# Patient Record
Sex: FEMALE | Race: WHITE
[De-identification: names, ages, dates, MRNs, and addresses within clinical notes are randomized per-mention and may not be internally consistent; named-entity substitution may affect disease eponyms.]

---

## 2017-04-05 ENCOUNTER — HOSPITAL ENCOUNTER (EMERGENCY)
Dept: HOSPITAL 25 - UCEAST | Age: 62
Discharge: LEFT BEFORE DISCHARGE | End: 2017-04-05
Payer: COMMERCIAL

## 2017-04-05 DIAGNOSIS — Z53.21: ICD-10-CM

## 2017-04-05 DIAGNOSIS — M79.646: ICD-10-CM

## 2017-04-05 DIAGNOSIS — M25.529: Primary | ICD-10-CM

## 2019-09-21 ENCOUNTER — HOSPITAL ENCOUNTER (INPATIENT)
Dept: HOSPITAL 25 - ED | Age: 64
LOS: 11 days | Discharge: HOME | DRG: 720 | End: 2019-10-02
Attending: INTERNAL MEDICINE | Admitting: INTERNAL MEDICINE
Payer: COMMERCIAL

## 2019-09-21 ENCOUNTER — HOSPITAL ENCOUNTER (EMERGENCY)
Dept: HOSPITAL 25 - UCEAST | Age: 64
Discharge: HOME | End: 2019-09-21
Payer: COMMERCIAL

## 2019-09-21 VITALS — DIASTOLIC BLOOD PRESSURE: 80 MMHG | SYSTOLIC BLOOD PRESSURE: 156 MMHG

## 2019-09-21 DIAGNOSIS — B34.9: Primary | ICD-10-CM

## 2019-09-21 DIAGNOSIS — R80.9: ICD-10-CM

## 2019-09-21 DIAGNOSIS — J91.8: ICD-10-CM

## 2019-09-21 DIAGNOSIS — B02.9: ICD-10-CM

## 2019-09-21 DIAGNOSIS — E87.2: ICD-10-CM

## 2019-09-21 DIAGNOSIS — J96.01: ICD-10-CM

## 2019-09-21 DIAGNOSIS — A48.1: ICD-10-CM

## 2019-09-21 DIAGNOSIS — I10: ICD-10-CM

## 2019-09-21 DIAGNOSIS — E46: ICD-10-CM

## 2019-09-21 DIAGNOSIS — I48.91: ICD-10-CM

## 2019-09-21 DIAGNOSIS — E11.9: ICD-10-CM

## 2019-09-21 DIAGNOSIS — J44.0: ICD-10-CM

## 2019-09-21 DIAGNOSIS — E87.6: ICD-10-CM

## 2019-09-21 DIAGNOSIS — M81.0: ICD-10-CM

## 2019-09-21 DIAGNOSIS — Z79.01: ICD-10-CM

## 2019-09-21 DIAGNOSIS — E78.5: ICD-10-CM

## 2019-09-21 DIAGNOSIS — K76.0: ICD-10-CM

## 2019-09-21 DIAGNOSIS — E27.8: ICD-10-CM

## 2019-09-21 DIAGNOSIS — A41.9: Primary | ICD-10-CM

## 2019-09-21 DIAGNOSIS — Z80.1: ICD-10-CM

## 2019-09-21 DIAGNOSIS — Z79.84: ICD-10-CM

## 2019-09-21 DIAGNOSIS — J44.1: ICD-10-CM

## 2019-09-21 DIAGNOSIS — J18.1: ICD-10-CM

## 2019-09-21 DIAGNOSIS — F17.210: ICD-10-CM

## 2019-09-21 DIAGNOSIS — Z87.442: ICD-10-CM

## 2019-09-21 DIAGNOSIS — R51: ICD-10-CM

## 2019-09-21 LAB
ALBUMIN SERPL BCG-MCNC: 3.6 G/DL (ref 3.2–5.2)
ALBUMIN/GLOB SERPL: 1 {RATIO} (ref 1–3)
ALP SERPL-CCNC: 63 U/L (ref 34–104)
ALT SERPL W P-5'-P-CCNC: 21 U/L (ref 7–52)
ANION GAP SERPL CALC-SCNC: 9 MMOL/L (ref 2–11)
AST SERPL-CCNC: 26 U/L (ref 13–39)
BASOPHILS # BLD AUTO: 0.1 10^3/UL (ref 0–0.2)
BUN SERPL-MCNC: 15 MG/DL (ref 6–24)
BUN/CREAT SERPL: 18.8 (ref 8–20)
CALCIUM SERPL-MCNC: 9.1 MG/DL (ref 8.6–10.3)
CHLORIDE SERPL-SCNC: 95 MMOL/L (ref 101–111)
EOSINOPHIL # BLD AUTO: 0 10^3/UL (ref 0–0.6)
FLUAV RNA SPEC QL NAA+PROBE: NEGATIVE
FLUBV RNA SPEC QL NAA+PROBE: NEGATIVE
GLOBULIN SER CALC-MCNC: 3.5 G/DL (ref 2–4)
GLUCOSE SERPL-MCNC: 287 MG/DL (ref 70–100)
HCO3 SERPL-SCNC: 21 MMOL/L (ref 22–32)
HCT VFR BLD AUTO: 42 % (ref 35–47)
HGB BLD-MCNC: 14 G/DL (ref 12–16)
INR PPP/BLD: 1.26 (ref 0.82–1.09)
LYMPHOCYTES # BLD AUTO: 0.5 10^3/UL (ref 1–4.8)
MCH RBC QN AUTO: 27 PG (ref 27–31)
MCHC RBC AUTO-ENTMCNC: 33 G/DL (ref 31–36)
MCV RBC AUTO: 82 FL (ref 80–97)
MONOCYTES # BLD AUTO: 0.5 10^3/UL (ref 0–0.8)
NEUTROPHILS # BLD AUTO: 11.1 10^3/UL (ref 1.5–7.7)
NRBC # BLD AUTO: 0 10^3/UL
NRBC BLD QL AUTO: 0
PLATELET # BLD AUTO: 164 10^3/UL (ref 150–450)
POTASSIUM SERPL-SCNC: 3.8 MMOL/L (ref 3.5–5)
PROT SERPL-MCNC: 7.1 G/DL (ref 6.4–8.9)
RBC # BLD AUTO: 5.11 10^6 /UL (ref 3.7–4.87)
RBC UR QL AUTO: (no result)
SODIUM SERPL-SCNC: 125 MMOL/L (ref 135–145)
TROPONIN I SERPL-MCNC: 0.1 NG/ML (ref ?–0.04)
WBC # BLD AUTO: 12.2 10^3/UL (ref 3.5–10.8)
WBC UR QL AUTO: (no result)

## 2019-09-21 PROCEDURE — 85610 PROTHROMBIN TIME: CPT

## 2019-09-21 PROCEDURE — 87899 AGENT NOS ASSAY W/OPTIC: CPT

## 2019-09-21 PROCEDURE — 82565 ASSAY OF CREATININE: CPT

## 2019-09-21 PROCEDURE — 71260 CT THORAX DX C+: CPT

## 2019-09-21 PROCEDURE — 86140 C-REACTIVE PROTEIN: CPT

## 2019-09-21 PROCEDURE — 84156 ASSAY OF PROTEIN URINE: CPT

## 2019-09-21 PROCEDURE — 99285 EMERGENCY DEPT VISIT HI MDM: CPT

## 2019-09-21 PROCEDURE — 83036 HEMOGLOBIN GLYCOSYLATED A1C: CPT

## 2019-09-21 PROCEDURE — 74176 CT ABD & PELVIS W/O CONTRAST: CPT

## 2019-09-21 PROCEDURE — 32555 ASPIRATE PLEURA W/ IMAGING: CPT

## 2019-09-21 PROCEDURE — 36415 COLL VENOUS BLD VENIPUNCTURE: CPT

## 2019-09-21 PROCEDURE — 93005 ELECTROCARDIOGRAM TRACING: CPT

## 2019-09-21 PROCEDURE — 82040 ASSAY OF SERUM ALBUMIN: CPT

## 2019-09-21 PROCEDURE — 80202 ASSAY OF VANCOMYCIN: CPT

## 2019-09-21 PROCEDURE — 80061 LIPID PANEL: CPT

## 2019-09-21 PROCEDURE — 82803 BLOOD GASES ANY COMBINATION: CPT

## 2019-09-21 PROCEDURE — 76604 US EXAM CHEST: CPT

## 2019-09-21 PROCEDURE — 80048 BASIC METABOLIC PNL TOTAL CA: CPT

## 2019-09-21 PROCEDURE — 87077 CULTURE AEROBIC IDENTIFY: CPT

## 2019-09-21 PROCEDURE — 87040 BLOOD CULTURE FOR BACTERIA: CPT

## 2019-09-21 PROCEDURE — 84100 ASSAY OF PHOSPHORUS: CPT

## 2019-09-21 PROCEDURE — 83605 ASSAY OF LACTIC ACID: CPT

## 2019-09-21 PROCEDURE — 80053 COMPREHEN METABOLIC PANEL: CPT

## 2019-09-21 PROCEDURE — 87651 STREP A DNA AMP PROBE: CPT

## 2019-09-21 PROCEDURE — 81015 MICROSCOPIC EXAM OF URINE: CPT

## 2019-09-21 PROCEDURE — 81003 URINALYSIS AUTO W/O SCOPE: CPT

## 2019-09-21 PROCEDURE — 87205 SMEAR GRAM STAIN: CPT

## 2019-09-21 PROCEDURE — 36600 WITHDRAWAL OF ARTERIAL BLOOD: CPT

## 2019-09-21 PROCEDURE — 84157 ASSAY OF PROTEIN OTHER: CPT

## 2019-09-21 PROCEDURE — 84155 ASSAY OF PROTEIN SERUM: CPT

## 2019-09-21 PROCEDURE — 94640 AIRWAY INHALATION TREATMENT: CPT

## 2019-09-21 PROCEDURE — 84484 ASSAY OF TROPONIN QUANT: CPT

## 2019-09-21 PROCEDURE — 82570 ASSAY OF URINE CREATININE: CPT

## 2019-09-21 PROCEDURE — 85025 COMPLETE CBC W/AUTO DIFF WBC: CPT

## 2019-09-21 PROCEDURE — 71045 X-RAY EXAM CHEST 1 VIEW: CPT

## 2019-09-21 PROCEDURE — 87070 CULTURE OTHR SPECIMN AEROBIC: CPT

## 2019-09-21 PROCEDURE — 87086 URINE CULTURE/COLONY COUNT: CPT

## 2019-09-21 PROCEDURE — 71046 X-RAY EXAM CHEST 2 VIEWS: CPT

## 2019-09-21 PROCEDURE — 83735 ASSAY OF MAGNESIUM: CPT

## 2019-09-21 PROCEDURE — 87641 MR-STAPH DNA AMP PROBE: CPT

## 2019-09-21 PROCEDURE — 84520 ASSAY OF UREA NITROGEN: CPT

## 2019-09-21 PROCEDURE — 84134 ASSAY OF PREALBUMIN: CPT

## 2019-09-21 PROCEDURE — G0463 HOSPITAL OUTPT CLINIC VISIT: HCPCS

## 2019-09-21 PROCEDURE — 89051 BODY FLUID CELL COUNT: CPT

## 2019-09-21 PROCEDURE — 83615 LACTATE (LD) (LDH) ENZYME: CPT

## 2019-09-21 PROCEDURE — 99211 OFF/OP EST MAY X REQ PHY/QHP: CPT

## 2019-09-21 PROCEDURE — 87186 SC STD MICRODIL/AGAR DIL: CPT

## 2019-09-21 PROCEDURE — 99406 BEHAV CHNG SMOKING 3-10 MIN: CPT

## 2019-09-21 PROCEDURE — 93306 TTE W/DOPPLER COMPLETE: CPT

## 2019-09-21 PROCEDURE — 80076 HEPATIC FUNCTION PANEL: CPT

## 2019-09-21 NOTE — ED
Abdominal Pain/Female





- HPI Summary


HPI Summary: 


The patient is a 63 y/o F presenting to [facility] accompanied by grandson with a 

chief complaint of sudden onset left flank and low left back pain onset at 1700 

tonight. She reports that she has been suffering from a head cold with symptoms 

including a cough, rhinorrhea, and sinus congestion. She went to [facility] 

care for these symptoms this morning, and she was diagnosed with a viral 

infection and discharged home. She then went home and took a nap but woke up 

with left flank pain. She is now additionally c/o fever, weakness, mild SOB, 

and decreased appetite. Currently, her symptoms are rated 4/10 in severity. PMHx

: DM, HTN, kidney stones with lithotripsy. Heavy everyday cigarette smoker, no 

EtOH, no substance use. Medications reviewed. Allergies noted.





- History of Current Complaint


Chief Complaint: EDFlankPain


Stated Complaint: THINKS SHE HAS A KIDNEY INF PER PT


Time Seen by Provider: 19 22:49


Hx Obtained From: Patient, Family/Caretaker - grandson


Hx Last Menstrual Period: Years.


Onset/Duration: Sudden Onset - flank and back pain, Lasting Hours, Still Present


Timing: Hours


Severity Initially: Mild


Severity Currently: Moderate


Pain Intensity: 4


Pain Scale Used: 0-10 Numeric


Location: Flank - left


Radiates: Yes


Radiates to: Back - low left


Character: Dull


Aggravating Factor(s): Nothing


Alleviating Factor(s): Nothing


Associated Signs and Symptoms: Positive: Fever, Cough, Decreased Appetite, Other

: - weakness, mild SOB, rhinorrhea, sinus congestion


Allergies/Adverse Reactions: 


 Allergies











Allergy/AdvReac Type Severity Reaction Status Date / Time


 


No Known Allergies Allergy   Verified 19 19:27














PMH/Surg Hx/FS Hx/Imm Hx


Endocrine/Hematology History: Reports: Hx Diabetes


   Denies: Hx Thyroid Disease


Cardiovascular History: Reports: Hx Hypertension


   Denies: Hx Congestive Heart Failure, Hx Deep Vein Thrombosis, Hx Myocardial 

Infarction, Hx Pacemaker/ICD


Respiratory History: 


   Denies: Hx Asthma, Hx Chronic Obstructive Pulmonary Disease (COPD), Hx Lung 

Cancer, Hx Pneumonia, Hx Pulmonary Embolism


GI History: 


   Denies: Hx Gall Bladder Disease, Hx Gastrointestinal Bleed, Hx Ulcer, Hx 

Urosepsis


 History: Reports: Hx Kidney Stones


   Denies: Hx Renal Disease


Musculoskeletal History: 


   Denies: Hx Osteoporosis


Opthamlomology History: Reports: Hx Contacts or Glasses


Neurological History: 


   Denies: Hx Dementia, Hx Migraine, Hx Seizures, Hx Transient Ischemic Attacks 

(TIA)


Psychiatric History: 


   Denies: Hx Anxiety, Hx Depression, Hx Schizophrenia, Hx Bipolar Disorder





- Cancer History


Hx Chemotherapy: No


Hx Radiation Therapy: No





- Surgical History


Surgical History: Yes


Surgery Procedure, Year, and Place: L-SPINE.  .  LASER LITHOTRIPSY FOR 

KIDNEY STONES


Infectious Disease History: No


Infectious Disease History: 


   Denies: History Other Infectious Disease, Traveled Outside the [place] in Last 30 

Days





- Family History


Known Family History: 


   Negative: Cardiac Disease, Hypertension, Diabetes





- Social History


Alcohol Use: None


Hx Substance Use: No


Substance Use Type: Reports: None


Hx Tobacco Use: Yes


Smoking Status (MU): Heavy Every Day Tobacco Smoker


Type: Cigarettes


Amount Used/How Often: 1 PPD





Review of Systems


Positive: Fever


Positive: Other - rhinorrhea, sinus congestion


Positive: Shortness Of Breath, Cough


Positive: Other - decreased appetite


Positive: flank pain - left


Positive: Other - low left back pain


Positive: Weakness


All Other Systems Reviewed And Are Negative: Yes





Physical Exam





- Summary


Physical Exam Summary: 


Appearance: Ill-appearing, Well-nourished, lying in bed comfortably but 

somewhat somnolent although easy to arouse


Skin: Warm, dry, no obvious rash


Eyes: sclera anicteric, no conjunctival pallor


ENT: mucous membranes moist, pharynx appears normal


Neck: Supple, nontender


Respiratory: Clear to auscultation, no signs of respiratory distress


Cardiovascular: Tachycardic in mid 120s. No murmurs. Normal distal pulses in 

tibial and radial bilaterally.


Abdomen: Soft, nontender, normal active bowel sounds present


Musculoskeletal: No CVA tenderness, Normal, Strength/ROM Intact


Neurological: A&Ox3, awake and alert, mentation is normal, speech is fluent and 

appropriate


Psychiatric: affect is normal, does not appear anxious or depressed


Triage Information Reviewed: Yes


Vital Signs On Initial Exam: 


 Initial Vitals











Temp Pulse Resp BP Pulse Ox


 


 102.1 F   138   20   153/97   95 


 


 19 19:26  19 19:26  19 19:26  19 19:26  19 19:26











Vital Signs Reviewed: Yes





Diagnostics





- Vital Signs


 Vital Signs











  Temp Pulse Resp BP Pulse Ox


 


 09/21/19 19:26  102.1 F  138  20  153/97  95














- Laboratory


Lab Results: 


 Lab Results











  19 Range/Units





  22:05 22:05 22:05 


 


WBC  12.2 H    (3.5-10.8)  10^3/uL


 


RBC  5.11 H    (3.70-4.87)  10^6 /uL


 


Hgb  14.0    (12.0-16.0)  g/dL


 


Hct  42    (35-47)  %


 


MCV  82    (80-97)  fL


 


MCH  27    (27-31)  pg


 


MCHC  33    (31-36)  g/dL


 


RDW  14    (10-15)  %


 


Plt Count  164    (150-450)  10^3/uL


 


MPV  8.7    (7.4-10.4)  fL


 


Neut % (Auto)  91.2    %


 


Lymph % (Auto)  4.2    %


 


Mono % (Auto)  4.2    %


 


Eos % (Auto)  0.0    %


 


Baso % (Auto)  0.4    %


 


Absolute Neuts (auto)  11.1 H    (1.5-7.7)  10^3/ul


 


Absolute Lymphs (auto)  0.5 L    (1.0-4.8)  10^3/ul


 


Absolute Monos (auto)  0.5    (0-0.8)  10^3/ul


 


Absolute Eos (auto)  0.0    (0-0.6)  10^3/ul


 


Absolute Basos (auto)  0.1    (0-0.2)  10^3/ul


 


Absolute Nucleated RBC  0.0    10^3/ul


 


Nucleated RBC %  0.0    


 


INR (Anticoag Therapy)   1.26 H   (0.82-1.09)  


 


Sodium    125 L  (135-145)  mmol/L


 


Potassium    3.8  (3.5-5.0)  mmol/L


 


Chloride    95 L  (101-111)  mmol/L


 


Carbon Dioxide    21 L  (22-32)  mmol/L


 


Anion Gap    9  (2-11)  mmol/L


 


BUN    15  (6-24)  mg/dL


 


Creatinine    0.80  (0.51-0.95)  mg/dL


 


Est GFR ( Amer)    87.9  (>60)  


 


Est GFR (Non-Af Amer)    72.7  (>60)  


 


BUN/Creatinine Ratio    18.8  (8-20)  


 


Glucose    287 H  ()  mg/dL


 


Lactic Acid     (0.5-2.0)  mmol/L


 


Calcium    9.1  (8.6-10.3)  mg/dL


 


Total Bilirubin    0.40  (0.2-1.0)  mg/dL


 


AST    26  (13-39)  U/L


 


ALT    21  (7-52)  U/L


 


Alkaline Phosphatase    63  ()  U/L


 


Troponin I    0.10 H*  (<0.04)  ng/mL


 


C-Reactive Protein    399.05 H  (<8.01)  mg/L


 


Total Protein    7.1  (6.4-8.9)  g/dL


 


Albumin    3.6  (3.2-5.2)  g/dL


 


Globulin    3.5  (2-4)  g/dL


 


Albumin/Globulin Ratio    1.0  (1-3)  














  19 Range/Units





  22:05 


 


WBC   (3.5-10.8)  10^3/uL


 


RBC   (3.70-4.87)  10^6 /uL


 


Hgb   (12.0-16.0)  g/dL


 


Hct   (35-47)  %


 


MCV   (80-97)  fL


 


MCH   (27-31)  pg


 


MCHC   (31-36)  g/dL


 


RDW   (10-15)  %


 


Plt Count   (150-450)  10^3/uL


 


MPV   (7.4-10.4)  fL


 


Neut % (Auto)   %


 


Lymph % (Auto)   %


 


Mono % (Auto)   %


 


Eos % (Auto)   %


 


Baso % (Auto)   %


 


Absolute Neuts (auto)   (1.5-7.7)  10^3/ul


 


Absolute Lymphs (auto)   (1.0-4.8)  10^3/ul


 


Absolute Monos (auto)   (0-0.8)  10^3/ul


 


Absolute Eos (auto)   (0-0.6)  10^3/ul


 


Absolute Basos (auto)   (0-0.2)  10^3/ul


 


Absolute Nucleated RBC   10^3/ul


 


Nucleated RBC %   


 


INR (Anticoag Therapy)   (0.82-1.09)  


 


Sodium   (135-145)  mmol/L


 


Potassium   (3.5-5.0)  mmol/L


 


Chloride   (101-111)  mmol/L


 


Carbon Dioxide   (22-32)  mmol/L


 


Anion Gap   (2-11)  mmol/L


 


BUN   (6-24)  mg/dL


 


Creatinine   (0.51-0.95)  mg/dL


 


Est GFR ( Amer)   (>60)  


 


Est GFR (Non-Af Amer)   (>60)  


 


BUN/Creatinine Ratio   (8-20)  


 


Glucose   ()  mg/dL


 


Lactic Acid  1.9  (0.5-2.0)  mmol/L


 


Calcium   (8.6-10.3)  mg/dL


 


Total Bilirubin   (0.2-1.0)  mg/dL


 


AST   (13-39)  U/L


 


ALT   (7-52)  U/L


 


Alkaline Phosphatase   ()  U/L


 


Troponin I   (<0.04)  ng/mL


 


C-Reactive Protein   (<8.01)  mg/L


 


Total Protein   (6.4-8.9)  g/dL


 


Albumin   (3.2-5.2)  g/dL


 


Globulin   (2-4)  g/dL


 


Albumin/Globulin Ratio   (1-3)  











Result Diagrams: 


 19 05:20





 19 05:20


Lab Statement: Any lab studies that have been ordered have been reviewed, and 

results considered in the medical decision making process.





- Radiology


  ** CXR


Radiology Interpretation Completed By: ED Physician


Summary of Radiographic Findings: Possible right lower lobe PNA seen on the 

lateral view with loss of hemidiaphragm. ED physician has interpreted this 

report. Pending official read.





- CT


  ** Abd/Pel CT


CT Interpretation Completed By: Radiologist


Summary of CT Findings: Impression: 1. Right lower lobe pneumonia. 2. Moderate 

hepatic steatosis (14-28% fat fraction). 3. Indeterminate right adrenal nodule. 

If patient has no cancer history, consider follow-up adrenal CT or resection. 

If patient has a history of cancer, consider biopsy or PET/CT for patients with 

cancer history. ED physician has reviewed this report.





- EKG


  ** 2355


Cardiac Rate: Other Rate - 133 bpm


Summary of EKG Findings: EKG at 2355 with rate of 133 bpm and unclear rhythm, 

possible MAT. No STEMI.





Re-Evaluation





- Re-Evaluation


  ** First Eval


Re-Evaluation Time: 01:35


Comment: We discussed all results with plan for admission.





Abdominal Pain Fem Course/Dx





- Course


Course Of Treatment: Pt is a 63 y/o F with cc of URI symptoms and new onset 

left flank and low left back pain at 1700 tonight accompanied by fever, weakness

, mild SOB, and decreased appetite. Upon physical exam, the pt is ill-appearing 

and somewhat somnolent but easily arousable without any CVA tenderness, 

although she exhibits tachycardia in the mid 120s. Blood work reveals WBCs 12.2

, RBCs 5.11, abs netus 11.1, abs lymphs 0.5, INR 1.26, ssdium 125, chloride 95, 

carbon dioxide 21, glucose 287, troponin 0.10, and .05. UA obtained and 

is consistent with infection revealing 3+ protein, 1+ ketones, 2+ blood, 3+ RBCs

, presence of RBCs casts, and 3+ glucose. EKG at 2355 with rate of 133 bpm and 

unclear rhythm, possible MAT. Chest x-ray, per my interpretation, reveals 

possible right lower lobe PNA seen on the lateral view with loss of 

hemidiaphragm. Abdomen/Pelvic CT reveals right lower lobe PNA with moderate 

hepatic steatosis and indeterminate right adrenal nodule. I discussed the pts 

case with Dr. Romero, hospitalist, and he accepts the pt for admission. She 

understands and agrees with this plan. Dx is right lower lobe PNA. 40 minutes 

CCT.





- Diagnoses


Provider Diagnoses: 


 Right lower lobe pneumonia








- Provider Notifications


Discussed Care Of Patient With: Kali Romero - hospitalist


Time Discussed With Above Provider: 01:30


Instructed by Provider To: Admit As Observation - After discussing the pt's case

, Dr. Romero accepts the pt for admission.





- Critical Care Time


Critical Care Time: 30-74 min - 40 minutes





Discharge ED





- Sign-Out/Discharge


Documenting (check all that apply): Patient Departure - Patient accepted for 

admission by Dr. Romero.


Patient Received Moderate/Deep Sedation with Procedure: No





- Discharge Plan


Condition: Stable


Disposition: ADMITTED TO [facility]





- Billing Disposition and Condition


Condition: STABLE


Disposition: Admitted to [facility]





- Attestation Statements


Document Initiated by Scribe: Yes


Documenting Scribe: Katiuska Mccurdy


Provider For Whom Gael is Documenting (Include Credential): Dr. Hans Lind MD


Scribe Attestation: 


Katiuska CABRERA, scribed for Dr. Hans Lind MD on 19 at 0649. 


Scribe Documentation Reviewed: Yes


Provider Attestation: 


The documentation as recorded by the [email or web address] accurately reflects 

the service I personally performed and the decisions made by me, Dr. Hans Lind MD


Status of Scribe Document: Viewed

## 2019-09-21 NOTE — UC
General HPI





- HPI Summary


HPI Summary: 


CHIEF COMPLAINT and HPI: This is a  64-year-old female who comes to the urgent 

care Center with a complaint of increasing body aches, fever, slight sore throat

, and frontal headache over the past 3 days.  She works at [employer] and came home from work 3 days ago.  She stayed home yesterday and she 

continues to feel achy, fatigued, and feverish.  Although she is a one pack per 

day smoker, she denies chest pain, shortness of breath, increasing cough or 

other symptoms consistent with COPD.  She has used albuterol inhaler in the 

past.  She does have a history of hypertension and diabetes.


VITAL SIGNS & SaO2 REVIEWED. Within normal limits unless noted here. 156/80; 

pulse=125; temp=100.8


NURSES NOTE REVIEWED: "Pt c/o flu-like sx: fever, chills that started last 

."  














- History of Current Complaint


Chief Complaint: UCGeneralIllness


Stated Complaint: FEVER


Time Seen by Provider: 19 10:02


Hx Last Menstrual Period: Years.


Pain Intensity: 6





- Allergy/Home Medications


Allergies/Adverse Reactions: 


 Allergies











Allergy/AdvReac Type Severity Reaction Status Date / Time


 


No Known Allergies Allergy   Verified 19 09:49











Home Medications: 


 Home Medications





glipiZIDE TAB* [Glucotrol TAB*] 5 mg PO DAILY 19 [History Confirmed ]











PMH/Surg Hx/FS Hx/Imm Hx





- Additional Past Medical History


Additional PMH: 





PAST MEDICAL HISTORY- 


CHRONIC and RECURRENT HEALTH PROBLEM LIST REVIEWED.


Information relevant to present complaint: diabetes, one pack per day smoker, 

hypertension.


VISIT HISTORY REVIEWED: 


MEDICATIONS & ALLERGIES REVIEWED.





SOCIAL HISTORY:  one pack per day smoker, lives with family, and works at 

[employer] in the lab.. 





Other History Of: 


   Negative For: HIV, Hepatitis B, Hepatitis C





- Surgical History


Surgical History: Yes


Surgery Procedure, Year, and Place: L-SPINE.  .  LASER LITHOTRIPSY FOR 

KIDNEY STONES





- Family History


Known Family History: Positive: None





- Social History


Alcohol Use: None


Substance Use Type: None


Smoking Status (MU): Heavy Every Day Tobacco Smoker


Amount Used/How Often: 1 PPD





Review of Systems


All Other Systems Reviewed And Are Negative: Yes


Constitutional: Positive: Fever, Chills, Fatigue


Skin: Positive: Negative.  Negative: Rash


Eyes: Positive: Negative


ENT: Positive: Negative


Respiratory: Positive: Negative.  Negative: Shortness Of Breath, Cough


Cardiovascular: Positive: Negative.  Negative: Palpitations, Chest Pain


Gastrointestinal: Positive: Negative.  Negative: Abdominal Pain, Vomiting, 

Nausea


Genitourinary: Positive: Negative.  Negative: Dysuria


Motor: Positive: Negative


Neurovascular: Positive: Negative


Musculoskeletal: Positive: Negative


Neurological: Positive: Headache - frontal headache, mild


Is Patient Immunocompromised?: No





Physical Exam





- Summary


Physical Exam Summary: 





Appearance: The patient  is well-nourished.  Patient is lying down and appears 

uncomfortable.  Her neck is supple.  She is normally conversant.


Eyes: Conjunctiva are clear.  Pupils are equal and reactive to light and 

accommodation.  Extra ocular muscle movement is intact.


ENT: The hearing is grossly normal, the pharynx is normal, and the TMs are 

normal. There is no muffled or hoarse voice.  No stridor.


Neck: The neck is supple and there is no lymphadenopathy.


Respiratory: The chest is non-tender to palpation and without crepitus. The 

lungs are clear, there are normal breath sounds, and there is no respiratory 

distress.  No wheezes, rales or rhonchi.


Cardiovascular: Heart sounds reveal a regular rate and rhythm. There are no 

clicks, rubs or murmurs.  There are no carotid bruits or thrills.  Circulation 

is grossly intact.patient is tachycardic. 


Abdomen: The abdomen is soft and nontender. There is no organomegaly.  Bowel 

sounds are present and within normal limits.  No point tenderness at McBurneys 

point. No CVA tenderness.


Musculoskeletal: Strength is intact. The patient moves all extremities.  


Neurological: The patient is alert. Motor and sensory are examination grossly 

intact.  Speech is normal.


Psychological: The patient displays age appropriate behavior, and is 

conversant. GCS=15.


Skin: Negative for rashes.








Triage Information Reviewed: Yes


Vital Signs: 


 Initial Vital Signs











Temp  100.8 F   19 09:51


 


Pulse  125   19 09:51


 


Resp  20   19 09:51


 


BP  156/80   19 09:51


 


Pulse Ox  96   19 09:51














Course/Dx





- Course


Course Of Treatment: 





This is a  64-year-old female who comes to the urgent care Center with a 

complaint of increasing body aches, fever, slight sore throat, and frontal 

headache over the past 3 days.  She works at [employer] and came 

home from work 3 days ago.  She stayed home yesterday and she continues to feel 

achy, fatigued, and feverish.  Although she is a one pack per day smoker, she 

denies chest pain, shortness of breath, increasing cough or other symptoms 

consistent with COPD.  She has used albuterol inhaler in the past.  She does 

have a history of hypertension and diabetes.


VITAL SIGNS & SaO2 REVIEWED. Within normal limits unless noted here. 156/80; 

pulse=125; temp=100.8  RAPID STREP: NEGATIVE;  FLU=NEGATIVE. NO ORTHOSTASIS on 

sitting and standing.  My diagnosis is viral syndrome.  I discussed this with 

patient and encouraged her to increase hydration and use ibuprofen and 

acetaminophen for discomfort.





- Differential Dx - Multi-Symptom


Differential Diagnoses: Urinary Tract Infection, Other - influenza, pneumonia, 

exacerbation of COPD, viral syndrome





- Diagnoses


Provider Diagnosis: 


 Viral syndrome








Discharge ED





- Sign-Out/Discharge


Documenting (check all that apply): Patient Departure


All imaging exams completed and their final reports reviewed: No Studies





- Discharge Plan


Condition: Stable


Disposition: HOME


Patient Education Materials:  Viral Syndrome (ED)


Forms:  *Work Release


Referrals: 


Rayna Hillman MD [Primary Care Provider] - 


Additional Instructions: 


AS WE DISCUSSED: 


PLEASE SEEK CARE AT THE EMERGENCY DEPARTMENT IF SYMPTOMS WORSEN OR IF NEW 

SYMPTOMS DEVELOP. 


FOLLOW UP WITH YOUR PRIMARY CARE PHYSICIAN IF CONDITION CONTINUES BEYOND 3 DAYS 

WITHOUT IMPROVEMENT.


YOUR DIAGNOSIS IS: viral syndrome.





OTHER INSTRUCTIONS:


Hypertension Discharge Instructions: 


Your blood pressure reading today was 156/80, indicating HYPERTENSION. Follow-

up with your primary care provider within 4 weeks for blood pressure check and 

appropriate recommendations and treatment, as needed. 





TREATMENT:  SEE ATTACHED INSTRUCTIONS.


FOR PAIN AND/OR SLEEP:


For pain:  Ibuprofen (Motrin and other brand names) 400-600mg PLUS 

acetaminophen (Tylenol and other brand names) 500mg - 1000mg every 8 hours.  

benadryl, DIPHENHYDRAMINE, CAN ALSO HELP YOU SLEEP.

















- Billing Disposition and Condition


Condition: STABLE


Disposition: Home

## 2019-09-22 LAB
ANION GAP SERPL CALC-SCNC: 10 MMOL/L (ref 2–11)
BASOPHILS # BLD AUTO: 0 10^3/UL (ref 0–0.2)
BUN SERPL-MCNC: 15 MG/DL (ref 6–24)
BUN/CREAT SERPL: 21.4 (ref 8–20)
CALCIUM SERPL-MCNC: 8.1 MG/DL (ref 8.6–10.3)
CHLORIDE SERPL-SCNC: 103 MMOL/L (ref 101–111)
CHOLEST SERPL-MCNC: 97 MG/DL
EOSINOPHIL # BLD AUTO: 0 10^3/UL (ref 0–0.6)
GLUCOSE SERPL-MCNC: 228 MG/DL (ref 70–100)
HCO3 SERPL-SCNC: 18 MMOL/L (ref 22–32)
HCT VFR BLD AUTO: 40 % (ref 35–47)
HDLC SERPL-MCNC: 45.6 MG/DL
HGB BLD-MCNC: 14 G/DL (ref 12–16)
LYMPHOCYTES # BLD AUTO: 0.6 10^3/UL (ref 1–4.8)
MAGNESIUM SERPL-MCNC: 1.8 MG/DL (ref 1.9–2.7)
MCH RBC QN AUTO: 29 PG (ref 27–31)
MCHC RBC AUTO-ENTMCNC: 35 G/DL (ref 31–36)
MCV RBC AUTO: 83 FL (ref 80–97)
MONOCYTES # BLD AUTO: 0.4 10^3/UL (ref 0–0.8)
NEUTROPHILS # BLD AUTO: 9.4 10^3/UL (ref 1.5–7.7)
NRBC # BLD AUTO: 0 10^3/UL
NRBC BLD QL AUTO: 0.1
PLATELET # BLD AUTO: 140 10^3/UL (ref 150–450)
POTASSIUM SERPL-SCNC: 3.8 MMOL/L (ref 3.5–5)
RBC # BLD AUTO: 4.88 10^6 /UL (ref 3.7–4.87)
SODIUM SERPL-SCNC: 131 MMOL/L (ref 135–145)
TRIGL SERPL-MCNC: 115 MG/DL
TROPONIN I SERPL-MCNC: 0.06 NG/ML (ref ?–0.04)
WBC # BLD AUTO: 10.4 10^3/UL (ref 3.5–10.8)

## 2019-09-22 RX ADMIN — INSULIN LISPRO SCH UNIT: 100 INJECTION, SOLUTION INTRAVENOUS; SUBCUTANEOUS at 12:27

## 2019-09-22 RX ADMIN — INSULIN LISPRO SCH UNIT: 100 INJECTION, SOLUTION INTRAVENOUS; SUBCUTANEOUS at 17:45

## 2019-09-22 RX ADMIN — IPRATROPIUM BROMIDE AND ALBUTEROL SULFATE SCH: .5; 3 SOLUTION RESPIRATORY (INHALATION) at 07:38

## 2019-09-22 RX ADMIN — LOSARTAN POTASSIUM SCH MG: 25 TABLET, FILM COATED ORAL at 20:24

## 2019-09-22 RX ADMIN — SODIUM CHLORIDE, SODIUM LACTATE, POTASSIUM CHLORIDE, AND CALCIUM CHLORIDE SCH MLS/HR: 600; 310; 30; 20 INJECTION, SOLUTION INTRAVENOUS at 15:45

## 2019-09-22 RX ADMIN — INSULIN LISPRO SCH UNIT: 100 INJECTION, SOLUTION INTRAVENOUS; SUBCUTANEOUS at 08:37

## 2019-09-22 RX ADMIN — ACETAMINOPHEN PRN MG: 325 TABLET ORAL at 05:28

## 2019-09-22 RX ADMIN — INSULIN LISPRO SCH: 100 INJECTION, SOLUTION INTRAVENOUS; SUBCUTANEOUS at 17:53

## 2019-09-22 RX ADMIN — LOSARTAN POTASSIUM SCH MG: 25 TABLET, FILM COATED ORAL at 08:36

## 2019-09-22 RX ADMIN — IPRATROPIUM BROMIDE AND ALBUTEROL SULFATE SCH NEB: .5; 3 SOLUTION RESPIRATORY (INHALATION) at 04:53

## 2019-09-22 RX ADMIN — ACETAMINOPHEN PRN MG: 325 TABLET ORAL at 20:23

## 2019-09-22 RX ADMIN — DILTIAZEM HYDROCHLORIDE SCH MG: 30 TABLET, FILM COATED ORAL at 20:24

## 2019-09-22 RX ADMIN — ATORVASTATIN CALCIUM SCH MG: 10 TABLET, FILM COATED ORAL at 20:24

## 2019-09-22 RX ADMIN — ACETAMINOPHEN PRN MG: 325 TABLET ORAL at 11:54

## 2019-09-22 RX ADMIN — INSULIN LISPRO SCH UNIT: 100 INJECTION, SOLUTION INTRAVENOUS; SUBCUTANEOUS at 20:26

## 2019-09-22 RX ADMIN — MOMETASONE FUROATE SCH: 220 INHALANT RESPIRATORY (INHALATION) at 07:38

## 2019-09-22 RX ADMIN — DILTIAZEM HYDROCHLORIDE SCH MG: 60 TABLET, FILM COATED ORAL at 14:37

## 2019-09-22 RX ADMIN — MOMETASONE FUROATE SCH PUFF: 220 INHALANT RESPIRATORY (INHALATION) at 20:05

## 2019-09-22 RX ADMIN — ENOXAPARIN SODIUM SCH MG: 40 INJECTION SUBCUTANEOUS at 06:04

## 2019-09-22 RX ADMIN — DILTIAZEM HYDROCHLORIDE SCH MG: 60 TABLET, FILM COATED ORAL at 08:38

## 2019-09-22 RX ADMIN — SODIUM CHLORIDE, SODIUM LACTATE, POTASSIUM CHLORIDE, AND CALCIUM CHLORIDE SCH MLS/HR: 600; 310; 30; 20 INJECTION, SOLUTION INTRAVENOUS at 06:04

## 2019-09-22 NOTE — PN
Hospitalist Progress Note


Date of Service: 09/22/19





Brief update:





Admitted this AM. Legionella Ag came back positive. Still with tachycardia from 

MAT, likely from PNA (on top of possible COPD).





Patient reports feeling significantly better, only with a "little cough", 

nonproductive. No chest pain or SOB. Denies current fevers, chills, n/v/c/d or 

abdominal pain. 





well appearing, nad, no increased WOB


tachycardic, reg rhythm, no mgr


bibasilar coarse crackles


abd soft nt


no LE edema





Hyponatremia improving.





A/P


64W with HTN, DM2, presenting with fevers and flank pain, found with 

hyponatremia, Leg Ag positive, and CT A/P concerning for PNA. CURB-65 score is 0

, but PSA is Class IV, so will cont treatment in hospital with IV antibiotics.


- stop CTX, continue on Azithro IV alone


- cont dilt PO 60 q6h and monitor on tele, reviewed case with Cardiology and 

recommended to titrate off as underlying disease is treated, cont IVF for now 

too

## 2019-09-22 NOTE — ECHO
*[facility]*

[place] [address]

Phone: [phone]

Fax #: [phone]



-------------------------------------------------------------------

Transthoracic Echocardiogram



Patient: Narda Cortes                  MRN:        [id number]

:     1955                         Study Date: 2019

Age:     64                                 Accession#: [id number]

Gender:  F                                  HR:         97 bpm

Height:  62 in /157.5 cm                    BSA:        1.64 m^2

Weight:  139.7 lb /63.5 kg                  BMI:        25.6 kg/m^2



*Sonographer: * Sarah Kan RDCS RN

 

*Referring Physician: * Kali Romero

*Reading Physician: * Berhane Esparza MD



-------------------------------------------------------------------

Indications:   Abnormal EKG.



-------------------------------------------------------------------

History:  Admitted with pneumonia.  Risk factors:   Current tobacco

use. Hypertension. Diabetes mellitus.



-------------------------------------------------------------------

Conclusions



Summary:



- Left ventricle: The cavity size is mildly reduced. Wall thickness

  is mildly increased. Systolic function is normal. The estimated

  ejection fraction is 60-65%. Wall motion is normal; there are no

  regional wall motion abnormalities.

- Right ventricle: The cavity size is normal. Systolic function is

  normal.

- Left atrium: The atrium is normal in size.

- No significant valvular abnormalities notede.



-------------------------------------------------------------------

Study data:  Transthoracic echocardiogram.  Procedure:

Transthoracic echocardiography was performed. Image quality was

fair. The study was technically limited due to smoking history.

Complete 2D, spectral Doppler, and color flow Doppler.  Location:

Bedside.  Patient status:  Inpatient. Patient room number: 448-01.

Rhythm:  Normal sinus rhythm.



-------------------------------------------------------------------

Findings



Left ventricle:  The cavity size is mildly reduced. Wall thickness

is mildly increased. Systolic function is normal. The estimated

ejection fraction is 60-65%. Wall motion is normal; there are no

regional wall motion abnormalities. There is no consistent Doppler

evidence of clinically significant diastolic dysfunction.

Right ventricle:  The cavity size is normal. Systolic function is

normal.

Left atrium:  The atrium is normal in size.

Right atrium:  The atrium is normal in size.

Mitral valve:  The leaflets are mildly thickened.  There is no

evidence of stenosis.   There is trace regurgitation.

Aortic valve:   The valve is trileaflet. The leaflets are mildly

thickened.  There is no evidence of stenosis.   There is no

regurgitation.

Tricuspid valve:   The valve is structurally normal.    There is no

evidence of stenosis.   There is trace to mild regurgitation.

Pulmonic valve:    The valve is structurally normal.    There is no

evidence of stenosis.   There is trace regurgitation.

Aorta:  Aortic root: The aortic root is appears normal.

Ascending aorta: The ascending aorta is not dilated.

Aortic arch: The aortic arch is not dilated.

Pericardium:  There is no pericardial effusion.

Pulmonary arteries:

The main pulmonary artery is normal-sized.  Systolic pressure can

not be accurately estimated.

Systemic veins:

Inferior vena cava: The vessel is normal in size. There is (&gt;= 50%)

respiratory change in the IVC dimension.



-------------------------------------------------------------------

Measurements



 Left ventricle            Value        Ref         Aortic valve               Value       Ref

 KENDALL, LAX          (L)     3.7   cm     3.8 - 5.2   Maureen diam, ED               1.8   cm    ----

 ESD, LAX                  2.4   cm     2.2 - 3.5   Peak v, S                  1.63  m/sec ----

 FS, LAX                   35    %      27 - 45     VTI, S                     29.6  cm    ----

 PW, ED            (H)     1.1   cm     0.6 - 0.9   Mean grad, S               6.0   mm Hg ----

 IVS/PW, ED                0.94         ----------  Peak grad, S               11.0  mm Hg ----

 E&apos;, lat maureen, TDI  (L)     8.4   cm/sec &gt;=10.0      LVOT/AV, VTI ratio         0.72        --
--

 E/e&apos;, lat maureen,            12           ----------

 TDI                                                Mitral valve               Value       Ref

 E&apos;, med maureen, TDI  (L)     6.5   cm/sec &gt;=7.0       Peak E                     1     m/sec --
--

 E/e&apos;, med maureen,            15           ----------  Peak A                     0.78  m/sec ----

 TDI                                                Decel time                 183   ms    ----

 E&apos;, avg, TDI              7.5   cm/sec ----------  Peak grad, D               4.0   mm Hg ----

 E/e&apos;, avg, TDI            13           &lt;=14        Peak E/A ratio             1.3         --
--

 

 LVOT                      Value        Ref         Pulmonic valve             Value       Ref

 Peak liu, S               1.09  m/sec  ----------  Peak v, S                  0.95  m/sec ----

 VTI, S                    21.2  cm     ----------  Peak grad, S               4.0   mm Hg ----

 Mean grad, S              3     mm Hg  ----------

                                                    Aortic root                Value       Ref

 Ventricular septum        Value        Ref         Root diam                  2.5   cm    &lt;3.9

 IVS, ED           (H)     1.1   cm     0.6 - 0.9

                                                    Ascending aorta            Value       Ref

 Right ventricle           Value        Ref         AAo AP diam, S             3.2   cm    ----

 KENDALL, LAX                  2.6   cm     ----------

 KENDALL minor ax, A4C         2.7   cm     1.9 - 3.5   Aortic arch                Value       Ref

 mid                                                Arch diam                  2.4   cm    ----

 

 Left atrium               Value        Ref         Decending aorta            Value       Ref

 AP dim, ES                3.20  cm     2.70 -      [place] peak liu               0.74  m/sec ----

                                        3.80

 ML dim, A4C               3.3   cm     ----------  Inferior vena cava         Value       Ref

 SI dim, A4C               4.6   cm     ----------  Diam                       1.8   cm    ----

 Vol/bsa, ES, 1-p          19    ml/m^2 11 - 40

 A4C

 Vol/bsa, ES, A/L          22    ml/m^2 16 - 34

 

 Right atrium              Value        Ref

 ML dim, ES, A4C           3.5   cm     2.6 - 4.4

 SI dim, ES, A4C           4.5   cm     3.4 - 5.3

 Estimated RAP             3     mm Hg  ----------

 

Legend:

(L)  and  (H)  cookie values outside specified reference range.



Prepared and electronically signed by



Berhane Esparza MD

2019 14:09

## 2019-09-22 NOTE — HP
CC:  Rayna Hillman MD

 

ADMISSION HISTORY AND PHYSICAL:

 

DATE OF ADMISSION:  19

 

CHIEF COMPLAINT:  Fever and back pain.

 

HISTORY OF PRESENT ILLNESS:  This is a 64-year-old female with past medical history of diabetes, hype
rtension, dyslipidemia, osteoporosis, history of kidney stones, came in with the complaint of back pa
in.  She thought she was having another kidney stone.  She also stated that she has been having on an
d off fever and feeling dehydrated accompanied by some dry cough.  The only sick contact is her grand
son who lives with her, who is 17 years old, who was recently having a coughing spell and upper respi
ratory tract infection.  The patient otherwise offers no complaints of urinary tract infection such a
s burning sensation or pain with urination.  The back pain that she was complaining about was the low
 mid back pain, nonradiating. She offers no chest pain, no palpitations, and she denied any history o
f COPD even though the patient was on some albuterol and Flovent HFA at home.  She states that that w
as only given to her for bronchitis episode, and she only uses it intermittently.

 

PAST MEDICAL HISTORY:  As mentioned, diabetes, hypertension, dyslipidemia, osteoporosis, kidney stone
s with multiple stents.  She has a history of bronchitis, but not compliant with her fluticasone or a
lbuterol.  She denies any history of COPD stating that about a year ago she had a pulmonary function 
test, which stated that she did not have any COPD.  She is still an active smoker, smokes about a pac
k a day.

 

PAST SURGICAL HISTORY:  As mentioned, she had the stent.  She also has a history of .

 

HOME MEDICATIONS:  The patient currently on:

1.  Glipizide 5 mg oral daily.

2.  Losartan 25 mg p.o. b.i.d.

3.  Lipitor 10 mg p.o. daily.

4.  Alendronate 70 mg p.o. weekly.

5.  The fluticasone and the albuterol she only takes as p.r.n., has not used it for many days.

 

ALLERGIES:  No known drug allergies.

 

FAMILY HISTORY:  Dad  at age 68, mom at 78.  Both of them had lung cancer.

 

SOCIAL HISTORY:  The patient has a 42-pack-year history of smoking and still smokes about a pack a da
y.  Denies any alcohol or drug use.  Lives with her boyfriend and grandson.  Works at registration at
 [employer].  Is a full code and states that her boyfriend Julio would be her surrogate decision maker if
 she is confused.

 

REVIEW OF SYSTEMS:  A 14-point review of systems did not reveal any new information other than what i
s mentioned in the HPI.

 

                               PHYSICAL EXAMINATION

 

GENERAL:  The patient is awake, alert, and oriented x3.  Does not appear to be in any acute respirato
ry distress.

 

VITAL SIGNS:  In the ER, temperature max was documented at 102.9, BP was noted to be 142/82, heart ra
te was noted to be fluctuating from 111 all the way up to 142 with an irregular rate, respiratory rat
e was noted to be 24, saturating 88% to 90% on room air, but improved to 98% saturation on 3 L nasal 
cannula.

 

HEAD AND NECK:  Atraumatic and normocephalic.  Bilateral pupils are reactive.  Oral mucosa is moist.

 

NECK:  Supple.  No jugular venous distention.

 

LUNGS:  The patient had diffuse rhonchi and wheezes in all lung fields.  I could not appreciate any c
rackles.

 

HEART:  S1 and S2.  Irregularly irregular, tachycardic without any appreciable murmurs.

 

ABDOMEN:  Soft, nontender, nondistended.

 

EXTREMITIES:  No cyanosis, clubbing, or edema.

 

 LABORATORY DATA:  CBC shows elevated white count of 12.2, hemoglobin and hematocrit were stable, fernando
telet count was stable at 164.  Coagulation profile 1.26.  Complete metabolic panel shows sodium of 1
25, chloride 95, bicarb minimally decreased at 21. Creatinine was 0.8, troponin was noted at 0.1.  C-
reactive protein elevated. Random glucose was elevated at 287.  Lactic acid was noted to be normal. U
rinalysis was negative for any leuk esterase or nitrites, was positive for 2+ blood, 1+ ketones, and 
3+ protein.

 

Portable chest x-ray:  I could not appreciate any pneumonia, but official read by radiologist is stil
l pending.

 

CT abdomen and pelvis was read as right lower lobe pneumonia and moderate hepatic steatosis and inter
mediate right adrenal nodule.

 

IMPRESSION:  This is a 64-year-old female with diabetes, hypertension, dyslipidemia, here with fever,
 noted to have right lower lobe pneumonia.

 

ASSESSMENT:

1.  Sepsis secondary to right lower lobe pneumonia.  We will start the patient on ceftriaxone and evens
thromycin to cover for any community acquired pneumonia and monitor the patient's cultures of sputum 
and blood.

2.  Hypoxic respiratory failure, likely secondary to pneumonia, questionable component of chronic obs
tructive pulmonary disease given the diffuse wheezing.  We will treat as the patient has chronic obst
ructive pulmonary disease exacerbation given history of chronic smoking with 42-pack-year history.  F
or now, we will start the patient on steroids, DuoNeb, and the antibiotics as mentioned previously.

3.  Tachycardia.  It is unclear if it just sinus arrhythmia versus multifocal atrial tachycardia.  We
 will follow up an echocardiogram and get serial cardiac enzymes and consider a cardiology consult if
 necessary.  It could also be just secondary to sepsis, provoked from sepsis that the patient is havi
ng, and once the fever subsides, it should get better.

4.  Hepatic steatosis likely secondary to nonalcoholic liver disease.

5.  Right adrenal nodule.  We will consider followup with an adrenal CT on discharge.

6.  History of diabetes.  We will start the patient on insulin sliding scale.

7.  History of hypertension.  Restart home medication.

8.  History of dyslipidemia.  Restart home medication.

9.  DVT prophylaxis with subcu Lovenox.

10.  Code status:  Full code with boyfriend being the surrogate decision maker.

 

 

 

[id number]/CPS #: [id number]

## 2019-09-23 LAB
ANION GAP SERPL CALC-SCNC: 6 MMOL/L (ref 2–11)
BASOPHILS # BLD AUTO: 0 10^3/UL (ref 0–0.2)
BUN SERPL-MCNC: 17 MG/DL (ref 6–24)
BUN/CREAT SERPL: 27 (ref 8–20)
CALCIUM SERPL-MCNC: 7.9 MG/DL (ref 8.6–10.3)
CHLORIDE SERPL-SCNC: 103 MMOL/L (ref 101–111)
EOSINOPHIL # BLD AUTO: 0 10^3/UL (ref 0–0.6)
GLUCOSE SERPL-MCNC: 116 MG/DL (ref 70–100)
HCO3 SERPL-SCNC: 23 MMOL/L (ref 22–32)
HCT VFR BLD AUTO: 35 % (ref 35–47)
HGB BLD-MCNC: 11.8 G/DL (ref 12–16)
LYMPHOCYTES # BLD AUTO: 0.4 10^3/UL (ref 1–4.8)
MAGNESIUM SERPL-MCNC: 2.2 MG/DL (ref 1.9–2.7)
MCH RBC QN AUTO: 28 PG (ref 27–31)
MCHC RBC AUTO-ENTMCNC: 34 G/DL (ref 31–36)
MCV RBC AUTO: 83 FL (ref 80–97)
MONOCYTES # BLD AUTO: 0.3 10^3/UL (ref 0–0.8)
NEUTROPHILS # BLD AUTO: 8 10^3/UL (ref 1.5–7.7)
NRBC # BLD AUTO: 0 10^3/UL
NRBC BLD QL AUTO: 0
PLATELET # BLD AUTO: 152 10^3/UL (ref 150–450)
POTASSIUM SERPL-SCNC: 4.1 MMOL/L (ref 3.5–5)
RBC # BLD AUTO: 4.25 10^6 /UL (ref 3.7–4.87)
SODIUM SERPL-SCNC: 132 MMOL/L (ref 135–145)
WBC # BLD AUTO: 8.6 10^3/UL (ref 3.5–10.8)

## 2019-09-23 RX ADMIN — MOMETASONE FUROATE SCH PUFF: 220 INHALANT RESPIRATORY (INHALATION) at 07:23

## 2019-09-23 RX ADMIN — INSULIN LISPRO SCH UNIT: 100 INJECTION, SOLUTION INTRAVENOUS; SUBCUTANEOUS at 08:59

## 2019-09-23 RX ADMIN — PREDNISONE SCH MG: 20 TABLET ORAL at 09:00

## 2019-09-23 RX ADMIN — NICOTINE SCH PATCH: 21 PATCH TRANSDERMAL at 13:48

## 2019-09-23 RX ADMIN — AZITHROMYCIN SCH MLS/HR: 500 INJECTION, POWDER, LYOPHILIZED, FOR SOLUTION INTRAVENOUS at 00:05

## 2019-09-23 RX ADMIN — ATORVASTATIN CALCIUM SCH MG: 10 TABLET, FILM COATED ORAL at 20:13

## 2019-09-23 RX ADMIN — SODIUM CHLORIDE, SODIUM LACTATE, POTASSIUM CHLORIDE, AND CALCIUM CHLORIDE SCH MLS/HR: 600; 310; 30; 20 INJECTION, SOLUTION INTRAVENOUS at 20:14

## 2019-09-23 RX ADMIN — INSULIN LISPRO SCH UNIT: 100 INJECTION, SOLUTION INTRAVENOUS; SUBCUTANEOUS at 12:37

## 2019-09-23 RX ADMIN — SODIUM CHLORIDE, SODIUM LACTATE, POTASSIUM CHLORIDE, AND CALCIUM CHLORIDE SCH MLS/HR: 600; 310; 30; 20 INJECTION, SOLUTION INTRAVENOUS at 11:40

## 2019-09-23 RX ADMIN — DILTIAZEM HYDROCHLORIDE SCH: 30 TABLET, FILM COATED ORAL at 01:43

## 2019-09-23 RX ADMIN — ACETAMINOPHEN PRN MG: 325 TABLET ORAL at 06:58

## 2019-09-23 RX ADMIN — DILTIAZEM HYDROCHLORIDE ONE MG: 5 INJECTION INTRAVENOUS at 09:00

## 2019-09-23 RX ADMIN — DILTIAZEM HYDROCHLORIDE ONE: 5 INJECTION INTRAVENOUS at 10:13

## 2019-09-23 RX ADMIN — LOSARTAN POTASSIUM SCH MG: 25 TABLET, FILM COATED ORAL at 09:00

## 2019-09-23 RX ADMIN — ACETAMINOPHEN PRN MG: 325 TABLET ORAL at 21:39

## 2019-09-23 RX ADMIN — ENOXAPARIN SODIUM SCH MG: 40 INJECTION SUBCUTANEOUS at 06:06

## 2019-09-23 RX ADMIN — BENZONATATE PRN MG: 100 CAPSULE ORAL at 20:04

## 2019-09-23 RX ADMIN — INSULIN LISPRO SCH UNIT: 100 INJECTION, SOLUTION INTRAVENOUS; SUBCUTANEOUS at 20:36

## 2019-09-23 RX ADMIN — DILTIAZEM HYDROCHLORIDE SCH MG: 30 TABLET, FILM COATED ORAL at 20:05

## 2019-09-23 RX ADMIN — DILTIAZEM HYDROCHLORIDE SCH MG: 30 TABLET, FILM COATED ORAL at 14:54

## 2019-09-23 RX ADMIN — MOMETASONE FUROATE SCH PUFF: 220 INHALANT RESPIRATORY (INHALATION) at 19:25

## 2019-09-23 RX ADMIN — DILTIAZEM HYDROCHLORIDE SCH MG: 30 TABLET, FILM COATED ORAL at 10:08

## 2019-09-23 RX ADMIN — INSULIN LISPRO SCH UNIT: 100 INJECTION, SOLUTION INTRAVENOUS; SUBCUTANEOUS at 17:08

## 2019-09-23 RX ADMIN — GUAIFENESIN SCH MG: 600 TABLET, EXTENDED RELEASE ORAL at 20:05

## 2019-09-23 RX ADMIN — IPRATROPIUM BROMIDE AND ALBUTEROL SULFATE PRN NEB: .5; 3 SOLUTION RESPIRATORY (INHALATION) at 06:57

## 2019-09-23 RX ADMIN — ACETAMINOPHEN PRN MG: 325 TABLET ORAL at 15:25

## 2019-09-23 NOTE — PN
Hospitalist Progress Note


Date of Service: 09/23/19





Subjective-


Pt has main complaint of difficulty to breath along with congestion and cough. 

Pt agreed to stop smoking.





No overnight events.





Objective-





GENERAL: Pt appears uncomfortable. The patient is awake, alert, and oriented 

x3. 


 


HEAD AND NECK:  Atraumatic and normocephalic.  Bilateral pupils are reactive.  

Oral mucosa is moist.


 


NECK:  Supple.  No jugular venous distention.


 


LUNGS:  The patient had diffuse rhonchi and wheezes in all lung fields.


 


HEART:  S1 and S2.  Irregularly irregular, tachycardic without any appreciable 

murmurs.


 


ABDOMEN:  Soft, nontender, nondistended.


 


EXTREMITIES:  No cyanosis, clubbing, or edema.





Vitals


Temperature- 97.6 C


Pulse rate-95


Resp. rate- 18


O2 sat.- 94


BP- 105/59





Hgb-11.8


abs neutrophils- 8.0


abs monocytes-.4


Na-132


BUN/creatinine- 27.0


glucose-116


poc glucose- 135


calcium- 7.9





EKG- atrial fibrillation





Assessment/Plan-





Pt is a 64yr old female with past medical hx of diabetes, htn, dyslipidemia, 

osteoporosis, bronchitis, kidney stones w/ multiple stents. Pt presented with 

fevers and flank pain with hyponatremia and is Legionella positive and CT shows 

PNA.





1.  Sepsis secondary to right lower lobe pneumonia due to Legionella


   -Azithromycin 500mg in 250 mls IV





2.  Hypoxic respiratory failure, likely secondary to pneumonia, questionable 

component of chronic obstructive 


pulmonary disease given the diffuse wheezing.  We will treat as the patient has 

chronic obstructive pulmonary 


disease exacerbation given history of chronic smoking with 42-pack-year 

history.  For now, we will start the patient 


on steroids.


-Prednisone 40 mg PO daily


- Albuterol/ipratropium - 1 neb INH RT Q4 hrs PRN





3.  Has atrial fibrillation and [id number] risk of 2


   - diltiazem 30mg PO Q6H


   -Rivaroxaban 20mg PO QPM RIGOBERTO





4.  Hepatic steatosis likely secondary to nonalcoholic liver disease.





5.  Right adrenal nodule.  We will consider followup with an adrenal CT on 

discharge.





6.  History of diabetes.


   -Lispro 0 units subcut


   -Glipizide 5mg oral daily





7.  History of hypertension.  


   Losartan 25 mg po 





8.  History of dyslipidemia.  


   atorvastatin-10mg po daily


9. Osteoporosis


   -alendronate 70 mg po weekly


10. DVT prophylaxis


   -Enoxaparin 40 mg subcut


11. Smoking cessation


   -pt agreed to nicotine patch 21mg(1 patch

## 2019-09-23 NOTE — PN
Subjective


Date of Service: 09/23/19


Interval History: 





Pt had one episode of tachycardia with SOB and flushing. Telemetry showed 

atrial flutter pattern with . She missed one diltiazem dose overnight due 

to borderline BP. 





Still spiked fever in the last 24 hours. 





In general, pt felt her dyspnea and cough improved. 





Objective


Active Medications: 








Acetaminophen (Tylenol Tab*)  975 mg PO Q8H PRN


   PRN Reason: MILD PAIN or TEMP > 100.4


   Last Admin: 09/23/19 15:25 Dose:  975 mg


Albuterol/Ipratropium (Duoneb (Albuterol 2.5 Mg/Ipratropium 0.5 Mg))  1 neb INH 

[id number] AWAKE PRN


   PRN Reason: SOB/WHEEZING


   Last Admin: 09/23/19 06:57 Dose:  1 neb


Atorvastatin Calcium (Lipitor*)  10 mg PO BEDTIME [facility]


   Last Admin: 09/22/19 20:24 Dose:  10 mg


Dextrose (Dextrose 50% Vial 50 Ml*)  25 ml IV PUSH .FOR FS < 60 - SS PRN


   PRN Reason: FS < 60


Diltiazem HCl (Cardizem Tab*)  30 mg PO Q6H [facility]


   Last Admin: 09/23/19 14:54 Dose:  30 mg


Glipizide (Glucotrol Tab*)  5 mg PO DAILY [facility]


Lactated Ringer's (Lactated Ringers 1000 Ml Bag*)  1,000 mls @ 125 mls/hr IV 

PER RATE [facility]


   Last Admin: 09/23/19 11:40 Dose:  125 mls/hr


Azithromycin (Zithromax 500 Mg/250 Ml)  500 mg in 250 mls @ 250 mls/hr IVPB 

Q24H [facility]


   Last Admin: 09/23/19 00:05 Dose:  250 mls/hr


Insulin Human Lispro (Humalog*)  0 units SUBCUT ACHS [facility]; Protocol


   Last Admin: 09/23/19 12:37 Dose:  6 unit


Mometasone Furoate (Asmanex 220 Mcg Mdi *)  2 puff INH BID [facility]


   Last Admin: 09/23/19 07:23 Dose:  2 puff


Nicotine (Nicotine Patch 21 Mg/24 Hr*)  1 patch TRANSDERM DAILY [facility]


   Last Admin: 09/23/19 13:48 Dose:  1 patch


Pharmacy Profile Note (Nicotine Patch Removal Note*)  1 note PATCH OFF 2100 [facility]


Prednisone (Deltasone Tab*)  40 mg PO DAILY [facility]


   Stop: 09/25/19 09:01


   Last Admin: 09/23/19 09:00 Dose:  40 mg


Rivaroxaban (Xarelto(*))  20 mg PO QPM [facility]








 Vital Signs - 8 hr











  09/23/19 09/23/19 09/23/19





  08:55 11:55 15:15


 


Temperature  97.6 F 100.1 F


 


Pulse Rate 120 95 110


 


Respiratory  18 18





Rate   


 


Blood Pressure 112/61 105/59 122/56





(mmHg)   


 


O2 Sat by Pulse  94 91





Oximetry   











Oxygen Devices in Use Now: Nasal Cannula


Exam: 





General - NAD, sitting up in bed, flushed looking, breathing fast


Eyes - PERRLA, EOM intact


HEENT- no abnormality


Cardiovascular - RRR no m/r/g, no JVD, no carotid bruits


Lungs - right basal creps, no use of acessory muscles, no crackles or wheezes.


Skin - No rashes, skin warm and dry, no erythematous areas


Abdomen - Normal bowel sounds, abdomen soft and nontender


Extremities - No edema, cyanosis or clubbing


Musculo Skeletal - 5/5 strength, normal range of motion, no swollen or 

erythematous joints.


Neurological  Alert and oriented x 3, CN 2-12 grossly intact.


Psychiatry-stable mood





Result Diagrams: 


 09/23/19 05:20





 09/23/19 05:20


Additional Lab and Data: 


 Lab Results











  09/21/19 09/21/19 09/21/19 Range/Units





  22:05 22:05 22:05 


 


WBC  12.2 H    (3.5-10.8)  10^3/uL


 


RBC  5.11 H    (3.70-4.87)  10^6 /uL


 


Hgb  14.0    (12.0-16.0)  g/dL


 


Hct  42    (35-47)  %


 


MCV  82    (80-97)  fL


 


MCH  27    (27-31)  pg


 


MCHC  33    (31-36)  g/dL


 


RDW  14    (10-15)  %


 


Plt Count  164    (150-450)  10^3/uL


 


MPV  8.7    (7.4-10.4)  fL


 


Neut % (Auto)  91.2    %


 


Lymph % (Auto)  4.2    %


 


Mono % (Auto)  4.2    %


 


Eos % (Auto)  0.0    %


 


Baso % (Auto)  0.4    %


 


Absolute Neuts (auto)  11.1 H    (1.5-7.7)  10^3/ul


 


Absolute Lymphs (auto)  0.5 L    (1.0-4.8)  10^3/ul


 


Absolute Monos (auto)  0.5    (0-0.8)  10^3/ul


 


Absolute Eos (auto)  0.0    (0-0.6)  10^3/ul


 


Absolute Basos (auto)  0.1    (0-0.2)  10^3/ul


 


Absolute Nucleated RBC  0.0    10^3/ul


 


Nucleated RBC %  0.0    


 


INR (Anticoag Therapy)   1.26 H   (0.82-1.09)  


 


Sodium    125 L  (135-145)  mmol/L


 


Potassium    3.8  (3.5-5.0)  mmol/L


 


Chloride    95 L  (101-111)  mmol/L


 


Carbon Dioxide    21 L  (22-32)  mmol/L


 


Anion Gap    9  (2-11)  mmol/L


 


BUN    15  (6-24)  mg/dL


 


Creatinine    0.80  (0.51-0.95)  mg/dL


 


Est GFR ( Amer)    87.9  (>60)  


 


Est GFR (Non-Af Amer)    72.7  (>60)  


 


BUN/Creatinine Ratio    18.8  (8-20)  


 


Glucose    287 H  ()  mg/dL


 


Lactic Acid     (0.5-2.0)  mmol/L


 


Calcium    9.1  (8.6-10.3)  mg/dL


 


Total Bilirubin    0.40  (0.2-1.0)  mg/dL


 


AST    26  (13-39)  U/L


 


ALT    21  (7-52)  U/L


 


Alkaline Phosphatase    63  ()  U/L


 


Troponin I    0.10 H*  (<0.04)  ng/mL


 


C-Reactive Protein    399.05 H  (<8.01)  mg/L


 


Total Protein    7.1  (6.4-8.9)  g/dL


 


Albumin    3.6  (3.2-5.2)  g/dL


 


Globulin    3.5  (2-4)  g/dL


 


Albumin/Globulin Ratio    1.0  (1-3)  














  09/21/19 Range/Units





  22:05 


 


WBC   (3.5-10.8)  10^3/uL


 


RBC   (3.70-4.87)  10^6 /uL


 


Hgb   (12.0-16.0)  g/dL


 


Hct   (35-47)  %


 


MCV   (80-97)  fL


 


MCH   (27-31)  pg


 


MCHC   (31-36)  g/dL


 


RDW   (10-15)  %


 


Plt Count   (150-450)  10^3/uL


 


MPV   (7.4-10.4)  fL


 


Neut % (Auto)   %


 


Lymph % (Auto)   %


 


Mono % (Auto)   %


 


Eos % (Auto)   %


 


Baso % (Auto)   %


 


Absolute Neuts (auto)   (1.5-7.7)  10^3/ul


 


Absolute Lymphs (auto)   (1.0-4.8)  10^3/ul


 


Absolute Monos (auto)   (0-0.8)  10^3/ul


 


Absolute Eos (auto)   (0-0.6)  10^3/ul


 


Absolute Basos (auto)   (0-0.2)  10^3/ul


 


Absolute Nucleated RBC   10^3/ul


 


Nucleated RBC %   


 


INR (Anticoag Therapy)   (0.82-1.09)  


 


Sodium   (135-145)  mmol/L


 


Potassium   (3.5-5.0)  mmol/L


 


Chloride   (101-111)  mmol/L


 


Carbon Dioxide   (22-32)  mmol/L


 


Anion Gap   (2-11)  mmol/L


 


BUN   (6-24)  mg/dL


 


Creatinine   (0.51-0.95)  mg/dL


 


Est GFR ( Amer)   (>60)  


 


Est GFR (Non-Af Amer)   (>60)  


 


BUN/Creatinine Ratio   (8-20)  


 


Glucose   ()  mg/dL


 


Lactic Acid  1.9  (0.5-2.0)  mmol/L


 


Calcium   (8.6-10.3)  mg/dL


 


Total Bilirubin   (0.2-1.0)  mg/dL


 


AST   (13-39)  U/L


 


ALT   (7-52)  U/L


 


Alkaline Phosphatase   ()  U/L


 


Troponin I   (<0.04)  ng/mL


 


C-Reactive Protein   (<8.01)  mg/L


 


Total Protein   (6.4-8.9)  g/dL


 


Albumin   (3.2-5.2)  g/dL


 


Globulin   (2-4)  g/dL


 


Albumin/Globulin Ratio   (1-3)  














Assess/Plan/Problems-Billing


Assessment: 





64W with HTN, DM2, presenting with fevers and flank pain, found with 

hyponatremia, Leg Ag positive, and CT A/P concerning for PNA. CURB-65 score is 0

, but PSA is Class IV, so will cont treatment in hospital with IV antibiotics. 

Her stay complicated with new onset atrial fibrillation with RVR. 








- Patient Problems


(1) Legionella pneumonia


Current Visit: Yes   Status: Acute   Code(s): A48.1 - LEGIONNAIRES' DISEASE   

SNOMED Code(s): 733151593


   Comment: - CT proved right basal pneumonia with legionella positive


- fever still persisted but Tmax coming down


- continue iv azithromycin   





(2) Atrial fibrillation


Current Visit: Yes   Status: Acute   Code(s): I48.91 - UNSPECIFIED ATRIAL 

FIBRILLATION   SNOMED Code(s): 62132460


   Comment: - initially MAT, newly found AFib with RVR, BP borderline but still 

holding


- TTE no valvular changes


- continue diltizem 30mg q6h strictly, hold other antihypertensive


- Discussed with patient today about anticoagulation for Afib (GARRY-VAC score 2)

, started rivaroxiban 20mg qpm


   





(3) COPD exacerbation


Current Visit: Yes   Status: Acute   Code(s): J44.1 - CHRONIC OBSTRUCTIVE 

PULMONARY DISEASE W (ACUTE) EXACERBATION   SNOMED Code(s): 795360429


   Comment: - complicated with acute COPD exacerbation with ongoing pneumonia


- pred 40mg for 3 days while continuing Duoneb and mometaone   





(4) Nicotine dependence


Current Visit: Yes   Status: Acute   Code(s): F17.200 - NICOTINE DEPENDENCE, 

UNSPECIFIED, UNCOMPLICATED   SNOMED Code(s): 80910387


   Comment: start nicotine patch   





(5) DVT prophylaxis


Current Visit: Yes   Status: Acute   Code(s): Z29.9 - ENCOUNTER FOR 

PROPHYLACTIC MEASURES, UNSPECIFIED   SNOMED Code(s): 805006690


   Comment: - off levonox in view of initiation of NOAC   


Status and Disposition: 





Inpatient Medicine. 





Attestation


Documenting Resident: Peyton Jane


Supervising Physician: Fabienne Lemos


Attestation: 


This service has been performed in part by a resident under the direction of a 

teaching physician.I, Fabienne Lemos, performed the service, or was physically 

present during the critical, or key portions of the service, furnished by the 

resident. I participated in the management of the patient.

## 2019-09-24 LAB
ANION GAP SERPL CALC-SCNC: 8 MMOL/L (ref 2–11)
BUN SERPL-MCNC: 12 MG/DL (ref 6–24)
BUN/CREAT SERPL: 23.5 (ref 8–20)
CALCIUM SERPL-MCNC: 7.8 MG/DL (ref 8.6–10.3)
CHLORIDE SERPL-SCNC: 104 MMOL/L (ref 101–111)
GLUCOSE SERPL-MCNC: 144 MG/DL (ref 70–100)
HCO3 SERPL-SCNC: 23 MMOL/L (ref 22–32)
HCT VFR BLD AUTO: 34 % (ref 35–47)
HGB BLD-MCNC: 11.5 G/DL (ref 12–16)
MCH RBC QN AUTO: 28 PG (ref 27–31)
MCHC RBC AUTO-ENTMCNC: 34 G/DL (ref 31–36)
MCV RBC AUTO: 83 FL (ref 80–97)
PLATELET # BLD AUTO: 162 10^3/UL (ref 150–450)
POTASSIUM SERPL-SCNC: 3.4 MMOL/L (ref 3.5–5)
RBC # BLD AUTO: 4.13 10^6 /UL (ref 3.7–4.87)
SODIUM SERPL-SCNC: 135 MMOL/L (ref 135–145)
WBC # BLD AUTO: 7.6 10^3/UL (ref 3.5–10.8)

## 2019-09-24 RX ADMIN — PIPERACILLIN AND TAZOBACTAM SCH MLS/HR: 3; .375 INJECTION, POWDER, LYOPHILIZED, FOR SOLUTION INTRAVENOUS; PARENTERAL at 23:00

## 2019-09-24 RX ADMIN — SODIUM CHLORIDE, SODIUM LACTATE, POTASSIUM CHLORIDE, AND CALCIUM CHLORIDE SCH MLS/HR: 600; 310; 30; 20 INJECTION, SOLUTION INTRAVENOUS at 14:32

## 2019-09-24 RX ADMIN — ENOXAPARIN SODIUM SCH MG: 60 INJECTION SUBCUTANEOUS at 17:17

## 2019-09-24 RX ADMIN — INSULIN LISPRO SCH UNIT: 100 INJECTION, SOLUTION INTRAVENOUS; SUBCUTANEOUS at 17:17

## 2019-09-24 RX ADMIN — DILTIAZEM HYDROCHLORIDE SCH MG: 30 TABLET, FILM COATED ORAL at 08:13

## 2019-09-24 RX ADMIN — NICOTINE SCH PATCH: 21 PATCH TRANSDERMAL at 08:14

## 2019-09-24 RX ADMIN — WATER SCH NOTE: 100 INJECTION, SOLUTION INTRAVENOUS at 20:15

## 2019-09-24 RX ADMIN — ACETAMINOPHEN PRN MG: 325 TABLET ORAL at 02:57

## 2019-09-24 RX ADMIN — ATORVASTATIN CALCIUM SCH MG: 10 TABLET, FILM COATED ORAL at 20:13

## 2019-09-24 RX ADMIN — DILTIAZEM HYDROCHLORIDE SCH MG: 30 TABLET, FILM COATED ORAL at 13:08

## 2019-09-24 RX ADMIN — WATER SCH NOTE: 100 INJECTION, SOLUTION INTRAVENOUS at 06:19

## 2019-09-24 RX ADMIN — PREDNISONE SCH MG: 20 TABLET ORAL at 08:14

## 2019-09-24 RX ADMIN — INSULIN LISPRO SCH UNIT: 100 INJECTION, SOLUTION INTRAVENOUS; SUBCUTANEOUS at 20:13

## 2019-09-24 RX ADMIN — GUAIFENESIN SCH MG: 600 TABLET, EXTENDED RELEASE ORAL at 20:13

## 2019-09-24 RX ADMIN — BENZONATATE PRN MG: 100 CAPSULE ORAL at 08:14

## 2019-09-24 RX ADMIN — DILTIAZEM HYDROCHLORIDE SCH MG: 30 TABLET, FILM COATED ORAL at 20:13

## 2019-09-24 RX ADMIN — MOMETASONE FUROATE SCH PUFF: 220 INHALANT RESPIRATORY (INHALATION) at 19:16

## 2019-09-24 RX ADMIN — ACETAMINOPHEN PRN MG: 325 TABLET ORAL at 09:02

## 2019-09-24 RX ADMIN — METOPROLOL TARTRATE PRN MG: 5 INJECTION, SOLUTION INTRAVENOUS at 22:26

## 2019-09-24 RX ADMIN — MOMETASONE FUROATE SCH PUFF: 220 INHALANT RESPIRATORY (INHALATION) at 08:08

## 2019-09-24 RX ADMIN — INSULIN LISPRO SCH: 100 INJECTION, SOLUTION INTRAVENOUS; SUBCUTANEOUS at 08:48

## 2019-09-24 RX ADMIN — ACETAMINOPHEN PRN MG: 325 TABLET ORAL at 17:44

## 2019-09-24 RX ADMIN — AZITHROMYCIN SCH MLS/HR: 500 INJECTION, POWDER, LYOPHILIZED, FOR SOLUTION INTRAVENOUS at 00:19

## 2019-09-24 RX ADMIN — SODIUM CHLORIDE, SODIUM LACTATE, POTASSIUM CHLORIDE, AND CALCIUM CHLORIDE SCH MLS/HR: 600; 310; 30; 20 INJECTION, SOLUTION INTRAVENOUS at 06:18

## 2019-09-24 RX ADMIN — PIPERACILLIN AND TAZOBACTAM SCH MLS/HR: 3; .375 INJECTION, POWDER, LYOPHILIZED, FOR SOLUTION INTRAVENOUS; PARENTERAL at 13:08

## 2019-09-24 RX ADMIN — INSULIN LISPRO SCH UNIT: 100 INJECTION, SOLUTION INTRAVENOUS; SUBCUTANEOUS at 11:43

## 2019-09-24 RX ADMIN — DILTIAZEM HYDROCHLORIDE SCH MG: 30 TABLET, FILM COATED ORAL at 02:13

## 2019-09-24 RX ADMIN — ACETAMINOPHEN PRN MG: 325 TABLET ORAL at 23:47

## 2019-09-24 RX ADMIN — POTASSIUM CHLORIDE SCH MEQ: 1500 TABLET, EXTENDED RELEASE ORAL at 14:10

## 2019-09-24 RX ADMIN — METOPROLOL TARTRATE PRN MG: 5 INJECTION, SOLUTION INTRAVENOUS at 17:27

## 2019-09-24 RX ADMIN — IPRATROPIUM BROMIDE AND ALBUTEROL SULFATE PRN NEB: .5; 3 SOLUTION RESPIRATORY (INHALATION) at 02:20

## 2019-09-24 RX ADMIN — GUAIFENESIN SCH MG: 600 TABLET, EXTENDED RELEASE ORAL at 08:14

## 2019-09-24 RX ADMIN — AZITHROMYCIN SCH MLS/HR: 500 INJECTION, POWDER, LYOPHILIZED, FOR SOLUTION INTRAVENOUS at 23:59

## 2019-09-24 NOTE — PN
Hospitalist Progress Note


Date of Service: 09/24/19





Subjective-


Pt was much better in the ICU with the O2 supplementation. Breathing is much 

better.





-Overnight event of fevers throughout night and BP's in 150-160s.





Objective-





GENERAL: Pt appears alert, oriented, and interactive


EENT: normocephalic


NECK:  Supple.  No jugular venous distention.


LUNGS:  The patient had rhonchi bilaterally


HEART:  no appreciable murmurs.


ABDOMEN:  Soft, nontender, nondistended.


EXTREMITIES:  No cyanosis, clubbing, or edema.





hgb- 11.5


hct-34


abs lymphocytes- 0.6


K+= 3.4


BUN/creatinine-23.5


glucose-144


Ca-7.8


CRP-231.38





vitals- 


at 0941 was Temp. of 98.8 F


Heart rate- 93 (1215)


resp rate-27


o2 sat-96


02 flow rate-30 (1010)


BP- 112/72 (1215)





EKG-Atrial Fibrillation. No p-wave, Normal axis, irregular rhythm, no LVH, no 

RVH, QRS<120, , [place] interval<200, QTc=normal





X-ray- Right mid lower lung consolidation w/ right pleural effusion. Lung 

parenchyma shows alveolar opacification of right mid-lower lung





Chest u/s- Small right pleural effusion





Assessment/Plan





Pt is a 64yr old female w/ past medical hx of diabetes, htn, dyslipidemia, 

osteoporosis, bornchitits, kidney stones, and multiple stents. Pt presented 

with fevers and flank pain with hyponatremia and is Legionella positive and CT 

shows PNA.





1.  Sepsis secondary to right lower lobe pneumonia due to Legionella 


   -Azithromycin 500mg in 250 mls IV





2.  Hypoxic respiratory failure, likely secondary to pneumonia, questionable 

component of chronic obstructive 


pulmonary disease given the diffuse wheezing.  We will treat as the patient has 

chronic obstructive pulmonary 


disease exacerbation given history of chronic smoking with 42-pack-year 

history.  For now, we will start the patient 


on steroids.


-Prednisone 40 mg PO daily


- Albuterol/ipratropium - 1 neb INH RT Q4 hrs PRN





3.  Has atrial fibrillation and [id number] risk of 2


   - diltiazem 30mg PO Q6H


   -Rivaroxaban 20mg PO QPM RIGOBERTO





4.  Hepatic steatosis likely secondary to nonalcoholic liver disease.





5.  Right adrenal nodule.  We will consider followup with an adrenal CT on 

discharge.





6.  History of diabetes.


   -Lispro sliding scale


   -Glipizide 5mg oral daily





7.  History of hypertension.  


   -diltiazem 30mg PO Q6H





8.  History of dyslipidemia.  


   atorvastatin-10mg po daily


9. Osteoporosis


   -alendronate 70 mg po weekly


10. DVT prophylaxis


   -rivoraxaban 20mg


11. Smoking cessation


   -pt agreed to nicotine patch 21mg(1 patch

## 2019-09-24 NOTE — PN
Subjective


Date of Service: 09/24/19


Interval History: 





Early morning Narda was noted to be tachypneic and had an increased oxygen 

requirement; evaluated by Dr. Jane and myself and decision was made to transfer 

her to ICU.  CXR showed RLL infiltrate which was not present prior to today.  

We reordered blood cultures, broadened antibiotics to include pip/tazo, and 

consulted Dr. Esparza.  High flow nasal cannula was started this afternoon.  I 

came back to see her in the ICU this afternoon and she is much more comfortable 

on vapotherm.   





Objective


Active Medications: 








Acetaminophen (Tylenol Tab*)  650 mg PO Q4H PRN


   PRN Reason: PAIN - MILD


   Last Admin: 09/24/19 09:02 Dose:  650 mg


Albuterol/Ipratropium (Duoneb (Albuterol 2.5 Mg/Ipratropium 0.5 Mg))  1 neb INH 

RT.[id number] AWAKE PRN


   PRN Reason: SOB/WHEEZING


   Last Admin: 09/24/19 02:20 Dose:  1 neb


Atorvastatin Calcium (Lipitor*)  10 mg PO BEDTIME [facility]


   Last Admin: 09/23/19 20:13 Dose:  10 mg


Benzonatate (Tessalon Cap*)  100 mg PO BID PRN


   PRN Reason: COUGH


   Last Admin: 09/24/19 08:14 Dose:  100 mg


Dextrose (Dextrose 50% Vial 50 Ml*)  25 ml IV PUSH .FOR FS < 60 - SS PRN


   PRN Reason: FS < 60


Diltiazem HCl (Cardizem Tab*)  30 mg PO Q6H [facility]


   Last Admin: 09/24/19 13:08 Dose:  30 mg


Guaifenesin (Mucinex*)  600 mg PO BID [facility]


   Last Admin: 09/24/19 08:14 Dose:  600 mg


Lactated Ringer's (Lactated Ringers 1000 Ml Bag*)  1,000 mls @ 125 mls/hr IV 

PER RATE [facility]


   Last Admin: 09/24/19 14:32 Dose:  125 mls/hr


Azithromycin (Zithromax 500 Mg/250 Ml)  500 mg in 250 mls @ 250 mls/hr IVPB 

Q24H [facility]


   Last Admin: 09/24/19 00:19 Dose:  250 mls/hr


Piperacillin Sod/Tazobactam (Sod 3.375 gm/ Sodium Chloride)  100 mls @ 25 mls/

hr IVPB Q8H [facility]


   Last Admin: 09/24/19 13:08 Dose:  25 mls/hr


Insulin Human Lispro (Humalog*)  0 units SUBCUT ACHS [facility]; Protocol


   Last Admin: 09/24/19 11:43 Dose:  3 unit


Metoprolol Tartrate (Lopressor Iv*)  5 mg IV Q6H PRN


   PRN Reason: TACHYCARDIA


Mometasone Furoate (Asmanex 220 Mcg Mdi *)  2 puff INH BID [facility]


   Last Admin: 09/24/19 08:08 Dose:  2 puff


Nicotine (Nicotine Patch 21 Mg/24 Hr*)  1 patch TRANSDERM DAILY [facility]


   Last Admin: 09/24/19 08:14 Dose:  1 patch


Pharmacy Consult (Zosyn Per Pharmacy*)  1 note FOLLOW UP .ZOSYN PER PHARMACY [facility]


Pharmacy Profile Note (Nicotine Patch Removal Note*)  1 note PATCH OFF 2100 [facility]


   Last Admin: 09/24/19 06:19 Dose:  1 note


Potassium Chloride (Klor Con Er Tab*)  20 meq PO DAILY [facility]


   Last Admin: 09/24/19 14:10 Dose:  20 meq


Prednisone (Deltasone Tab*)  40 mg PO DAILY [facility]


   Stop: 09/25/19 09:01


   Last Admin: 09/24/19 08:14 Dose:  40 mg


Rivaroxaban (Xarelto(*))  20 mg PO QPM [facility]


   Last Admin: 09/23/19 17:08 Dose:  20 mg








 Vital Signs - 8 hr











  09/24/19 09/24/19 09/24/19





  07:57 08:14 08:52


 


Temperature 99.2 F  102.6 F


 


Pulse Rate 124 137 


 


Respiratory 32 32 





Rate   


 


Blood Pressure 127/56  





(mmHg)   


 


O2 Sat by Pulse 91 91 





Oximetry   














  09/24/19 09/24/19 09/24/19





  09:20 09:30 09:41


 


Temperature   98.8 F


 


Pulse Rate 138 133 33


 


Respiratory 26 37 30





Rate   


 


Blood Pressure 135/84 144/76 144/73





(mmHg)   


 


O2 Sat by Pulse 90 93 92





Oximetry   














  09/24/19 09/24/19 09/24/19





  09:45 10:00 10:01


 


Temperature   


 


Pulse Rate 115 113 114


 


Respiratory 31 26 34





Rate   


 


Blood Pressure 132/76 133/71 





(mmHg)   


 


O2 Sat by Pulse 92 90 90





Oximetry   














  09/24/19 09/24/19 09/24/19





  10:03 10:15 10:30


 


Temperature   


 


Pulse Rate  102 98


 


Respiratory 32 29 27





Rate   


 


Blood Pressure  120/66 121/69





(mmHg)   


 


O2 Sat by Pulse  95 96





Oximetry   














  09/24/19 09/24/19 09/24/19





  10:45 11:00 11:01


 


Temperature   


 


Pulse Rate 104 131 126


 


Respiratory 30 23 32





Rate   


 


Blood Pressure 119/69  118/65





(mmHg)   


 


O2 Sat by Pulse 97 96 95





Oximetry   














  09/24/19 09/24/19 09/24/19





  11:15 11:30 11:45


 


Temperature   


 


Pulse Rate 123 119 117


 


Respiratory 31 27 28





Rate   


 


Blood Pressure 111/62 108/62 121/65





(mmHg)   


 


O2 Sat by Pulse 96 95 95





Oximetry   














  09/24/19 09/24/19 09/24/19





  12:00 12:15 12:30


 


Temperature 98.6 F  


 


Pulse Rate 111 93 59


 


Respiratory 29 27 25





Rate   


 


Blood Pressure 112/78 112/72 111/66





(mmHg)   


 


O2 Sat by Pulse 95 96 96





Oximetry   














  09/24/19 09/24/19 09/24/19





  12:45 13:00 13:15


 


Temperature   


 


Pulse Rate 85 116 122


 


Respiratory 29 29 29





Rate   


 


Blood Pressure 109/64 116/66 124/68





(mmHg)   


 


O2 Sat by Pulse 96 96 96





Oximetry   














  09/24/19 09/24/19 09/24/19





  13:30 13:46 14:00


 


Temperature   


 


Pulse Rate 119 122 111


 


Respiratory 25 33 31





Rate   


 


Blood Pressure 126/75 101/51 91/44





(mmHg)   


 


O2 Sat by Pulse 96 95 95





Oximetry   














  09/24/19 09/24/19 09/24/19





  14:30 14:45 15:00


 


Temperature   


 


Pulse Rate 100 103 107


 


Respiratory 26 30 30





Rate   


 


Blood Pressure 122/64 122/64 119/68





(mmHg)   


 


O2 Sat by Pulse 95 95 95





Oximetry   














  09/24/19





  15:15


 


Temperature 


 


Pulse Rate 108


 


Respiratory 28





Rate 


 


Blood Pressure 126/66





(mmHg) 


 


O2 Sat by Pulse 88





Oximetry 











Oxygen Devices in Use Now: High Flow Nasal Cannula


Appearance: alert, comfortable, well appearing and visiting with family


Eyes: No Scleral Icterus


Ears/Nose/Mouth/Throat: NL Teeth, Lips, Gums


Neck: NL Appearance and Movements; NL JVP


Respiratory: - - diffusely rhonchorous, comfortable breathing


Cardiovascular: - - tachycardic, irregular rhythm


Abdominal: NL Sounds; No Tenderness; No Distention


Lymphatic: No Cervical Adenopathy


Extremities: No Edema


Skin: No Rash or Ulcers


Result Diagrams: 


 09/24/19 06:55





 09/24/19 06:55


Additional Lab and Data: 


 Lab Results











  09/21/19 09/21/19 09/21/19 Range/Units





  22:05 22:05 22:05 


 


WBC  12.2 H    (3.5-10.8)  10^3/uL


 


RBC  5.11 H    (3.70-4.87)  10^6 /uL


 


Hgb  14.0    (12.0-16.0)  g/dL


 


Hct  42    (35-47)  %


 


MCV  82    (80-97)  fL


 


MCH  27    (27-31)  pg


 


MCHC  33    (31-36)  g/dL


 


RDW  14    (10-15)  %


 


Plt Count  164    (150-450)  10^3/uL


 


MPV  8.7    (7.4-10.4)  fL


 


Neut % (Auto)  91.2    %


 


Lymph % (Auto)  4.2    %


 


Mono % (Auto)  4.2    %


 


Eos % (Auto)  0.0    %


 


Baso % (Auto)  0.4    %


 


Absolute Neuts (auto)  11.1 H    (1.5-7.7)  10^3/ul


 


Absolute Lymphs (auto)  0.5 L    (1.0-4.8)  10^3/ul


 


Absolute Monos (auto)  0.5    (0-0.8)  10^3/ul


 


Absolute Eos (auto)  0.0    (0-0.6)  10^3/ul


 


Absolute Basos (auto)  0.1    (0-0.2)  10^3/ul


 


Absolute Nucleated RBC  0.0    10^3/ul


 


Nucleated RBC %  0.0    


 


INR (Anticoag Therapy)   1.26 H   (0.82-1.09)  


 


Sodium    125 L  (135-145)  mmol/L


 


Potassium    3.8  (3.5-5.0)  mmol/L


 


Chloride    95 L  (101-111)  mmol/L


 


Carbon Dioxide    21 L  (22-32)  mmol/L


 


Anion Gap    9  (2-11)  mmol/L


 


BUN    15  (6-24)  mg/dL


 


Creatinine    0.80  (0.51-0.95)  mg/dL


 


Est GFR ( Amer)    87.9  (>60)  


 


Est GFR (Non-Af Amer)    72.7  (>60)  


 


BUN/Creatinine Ratio    18.8  (8-20)  


 


Glucose    287 H  ()  mg/dL


 


Lactic Acid     (0.5-2.0)  mmol/L


 


Calcium    9.1  (8.6-10.3)  mg/dL


 


Total Bilirubin    0.40  (0.2-1.0)  mg/dL


 


AST    26  (13-39)  U/L


 


ALT    21  (7-52)  U/L


 


Alkaline Phosphatase    63  ()  U/L


 


Troponin I    0.10 H*  (<0.04)  ng/mL


 


C-Reactive Protein    399.05 H  (<8.01)  mg/L


 


Total Protein    7.1  (6.4-8.9)  g/dL


 


Albumin    3.6  (3.2-5.2)  g/dL


 


Globulin    3.5  (2-4)  g/dL


 


Albumin/Globulin Ratio    1.0  (1-3)  














  09/21/19 Range/Units





  22:05 


 


WBC   (3.5-10.8)  10^3/uL


 


RBC   (3.70-4.87)  10^6 /uL


 


Hgb   (12.0-16.0)  g/dL


 


Hct   (35-47)  %


 


MCV   (80-97)  fL


 


MCH   (27-31)  pg


 


MCHC   (31-36)  g/dL


 


RDW   (10-15)  %


 


Plt Count   (150-450)  10^3/uL


 


MPV   (7.4-10.4)  fL


 


Neut % (Auto)   %


 


Lymph % (Auto)   %


 


Mono % (Auto)   %


 


Eos % (Auto)   %


 


Baso % (Auto)   %


 


Absolute Neuts (auto)   (1.5-7.7)  10^3/ul


 


Absolute Lymphs (auto)   (1.0-4.8)  10^3/ul


 


Absolute Monos (auto)   (0-0.8)  10^3/ul


 


Absolute Eos (auto)   (0-0.6)  10^3/ul


 


Absolute Basos (auto)   (0-0.2)  10^3/ul


 


Absolute Nucleated RBC   10^3/ul


 


Nucleated RBC %   


 


INR (Anticoag Therapy)   (0.82-1.09)  


 


Sodium   (135-145)  mmol/L


 


Potassium   (3.5-5.0)  mmol/L


 


Chloride   (101-111)  mmol/L


 


Carbon Dioxide   (22-32)  mmol/L


 


Anion Gap   (2-11)  mmol/L


 


BUN   (6-24)  mg/dL


 


Creatinine   (0.51-0.95)  mg/dL


 


Est GFR ( Amer)   (>60)  


 


Est GFR (Non-Af Amer)   (>60)  


 


BUN/Creatinine Ratio   (8-20)  


 


Glucose   ()  mg/dL


 


Lactic Acid  1.9  (0.5-2.0)  mmol/L


 


Calcium   (8.6-10.3)  mg/dL


 


Total Bilirubin   (0.2-1.0)  mg/dL


 


AST   (13-39)  U/L


 


ALT   (7-52)  U/L


 


Alkaline Phosphatase   ()  U/L


 


Troponin I   (<0.04)  ng/mL


 


C-Reactive Protein   (<8.01)  mg/L


 


Total Protein   (6.4-8.9)  g/dL


 


Albumin   (3.2-5.2)  g/dL


 


Globulin   (2-4)  g/dL


 


Albumin/Globulin Ratio   (1-3)  











Microbiology and Other Data: 


 Microbiology











 09/24/19 10:08 Nasal Screen MRSA (PCR) - Final





 Nasal    Mrsa Detected


 


 09/21/19 22:05 Aerobic Blood Culture - Preliminary





 Blood Venous    No Growth Day 2





 Anaerobic Blood Culture - Preliminary





    No Growth Day 2


 


 09/21/19 22:05 Aerobic Blood Culture - Preliminary





 Blood Venous    No Growth Day 2





 Anaerobic Blood Culture - Preliminary





    No Growth Day 2


 


 09/21/19 23:18 Urine Culture - Final





 Urine    No Growth (<1,000 CFU/mL)


 


 09/22/19 12:15 Legionella Urinary Antigen - Final





 Urine    Positive Legionella Antigen





 Streptococcus pneumoniae Ag Screen - Final





    Negative S. pneumo Antigen














Assess/Plan/Problems-Billing


64 year old woman with history of nephrolithiasis, HTN, and tobacco use 

admitted on 9/22 with back pain and found to have RLL pneumonia on the CT abd/

pelvis and urine legionella antigen positive, was being treated with 

azithromycin, then with a decompensation this morning with worsening sepsis and 

hypoxic respiratory failure, transferred to the ICU 9/24 


 











- Patient Problems


(1) Acute hypoxemic respiratory failure


Current Visit: Yes   Status: Acute   Code(s): J96.01 - ACUTE RESPIRATORY 

FAILURE WITH HYPOXIA   SNOMED Code(s): 310225815


   Comment: ABG unable to be obtained 


stable now on high flow nasal cannula at 30L, 70% fiO2 


likely related to pneumonia + pleural effusion


Dr. Esparza got US of effusion, too small to be drained but concern for 

loculation 


will follow, may need intervention if suspicion for empyema    





(2) Legionella pneumonia


Current Visit: Yes   Status: Acute   Code(s): A48.1 - LEGIONNAIRES' DISEASE   

SNOMED Code(s): 659388988


   Comment: with sepsis 


abx broadened this morning to cover a polymicrobial pneumonia 


sputum culture was unable to be obtained    





(3) Atrial fibrillation


Current Visit: Yes   Status: Acute   Code(s): I48.91 - UNSPECIFIED ATRIAL 

FIBRILLATION   SNOMED Code(s): 86236027


   Comment: new AFib with RVR, BP okay 


TTE no valvular changes


continue diltizem 30mg q6h and hold other antihypertensive to allow room for 

rate control titration


discussed risks and benefits of anticoagulation with her and opted for 

anticoagulation for a [id number] of 2 


will switch xarelto to lovenox now in case she does need a chest tube or a 

thoracentesis 


   





(4) COPD exacerbation


Current Visit: Yes   Status: Acute   Code(s): J44.1 - CHRONIC OBSTRUCTIVE 

PULMONARY DISEASE W (ACUTE) EXACERBATION   SNOMED Code(s): 437325327


   Comment: complicated with acute COPD exacerbation with ongoing pneumonia


continue prednisone burst   





(5) Nicotine dependence


Current Visit: Yes   Status: Acute   Code(s): F17.200 - NICOTINE DEPENDENCE, 

UNSPECIFIED, UNCOMPLICATED   SNOMED Code(s): 27400263


   Comment: start nicotine patch   





(6) DVT prophylaxis


Current Visit: Yes   Status: Acute   Code(s): Z29.9 - ENCOUNTER FOR 

PROPHYLACTIC MEASURES, UNSPECIFIED   SNOMED Code(s): 180591653


   Comment: therapeutic lovenox   


Status and Disposition: 





requires ICU level of care for vapotherm

## 2019-09-24 NOTE — CONS
CRITICAL CARE CONSULT:

 

DATE OF CONSULT:  09/24/19

 

REASON FOR CONSULT:  Pneumonia with respiratory failure.

 

HISTORY OF PRESENT ILLNESS:  This patient is a 64-year-old white female with a 
prior medical history of diabetes, hypertension, osteoporosis, renal calculi, 
and smoking (1 ppd) who was admitted 2 days ago with pneumonia (right lung base
) and atrial fibrillation (new).  Workup of the pneumonia revealed a positive 
legionella urinary antigen, and the patient was started on azithromycin 500 mg 
IV daily.  The AFib was treated with diltiazem and an oral anticoagulant. 



The patient developed respiratory distress and progressive hypoxemia earlier 
today and was brought to the ICU. Chest x-ray this AM showed a dense 
consolidation at the right base with a probable pleural effusion -ultrasound of 
the right hemithorax revealed a loculated pleural effusion.  The patient was 
placed on high-flow humidified nasal O2,with arterial O2 sats in the low 90s, 
and she appeared very comfortable after initiating the high-flow nasal O2.

 

MEDICATIONS:  On admission to the ICU included:

1.  Diltiazem: 30 mg p.o. q.6 hours.

2.  Azithromycin.

3.  Piperacillin/tazobactam as empiric therapy.

4.  Rivaroxaban 20 mg daily.

5.  Prednisone 40 mg daily (x 3 days).

6. Nicotine patch

 

The patient has a longstanding history of smoking and a nicotine patch was 
applied to remedy this.

 

PHYSICAL EXAM:  The patient was alert, oriented, and appeared comfortable.  
Vital Signs:  Temp 98.6, heart rate 114 and irregular, respirations 30 and not 
labored, O2 sat 95%, blood pressure 112/78.  HEENT:  There was no facial 
asymmetry, and oropharynx was clear.  Neck was supple.  There was no 
adenopathy.  Chest revealed diminished breath sounds and crackles at the right 
lung base posteriorly. Cardiac exam revealed no murmurs or rubs.  Abdomen was 
soft, nontender, and nondistended.  Extremities were warm, not cyanotic, and 
not edematous.

 

DIAGNOSTIC STUDIES/LAB DATA:  Pertinent laboratory data includes an admission 
white count of 12.2, which has decreased to normal.  On admission, sodium of 
125 which has increased to normal.  C-reactive protein on admission was 399.  
Magnesium of 1.8.  BUN and creatinine normal.  INR 1.26.

 

EKG shows atrial fibrillation. Chest x-ray as mentioned.

 

IMPRESSION:

1.  Legionella pneumonia with parapneumonic effusion.

2.  New-onset atrial fibrillation - ? legionella myocarditis.



 

MANAGEMENT PLAN:  Continue with azithromycin for Legionnaires. The patient does 
not need ventilatory support at this time, and will wean high-flow nasal O2 
when feasible.  If the patient continues to be febrile, then a chest tube may 
be necessary because of the loculated nature of the parapneumonic effusion.

 

CRITICAL CARE TIME:  60 minutes.

 

 [id number]/CPS #: [id number]

[email or web address]

## 2019-09-24 NOTE — PN
Sepsis Event Evaluation


Date of Evaluation: 09/24/19


Time of Evaluation: 08:27


Current Stage of Sepsis: Sepsis


Vital Signs - Last 12 Hours: 


 Vital Signs - 12 hr











  Temp Pulse Resp BP Pulse Ox


 


 09/24/19 08:14   137  32   91


 


 09/24/19 05:24     109/54 


 


 09/24/19 05:20     109/52 


 


 09/24/19 05:11     118/50 


 


 09/24/19 04:52     118/48 


 


 09/24/19 03:00  102.1 F    


 


 09/24/19 02:49  101.4 F  123  18  164/78  93


 


 09/24/19 02:11  98.5 F    154/76  92


 


 09/24/19 00:18  98.7 F  115  24  137/73  91


 


 09/23/19 22:45  99.5 F    


 


 09/23/19 21:32  102.7 F    152/70 


 


 09/23/19 20:38    24  











Lactic Acid: 


 











  09/21/19 09/22/19





  22:05 01:53


 


Lactic Acid  1.9  1.2














- Cardiopulmonary Exam


Capillary Refill: Immediate


Respiratory: Clear to Auscultation, - - tachypnea, in resp distress





- Skin Exam


Skin Exam: Flushed





- Samuel Coma Scale


Best Eye Response: 4 - Spontaneous


Best Motor Response: 6 - Obeys Commands


Best Verbal Response: 5 - Oriented


Coma Scale Total: 15





Assess/Plan/Problems-Billing


Assessment: 





64W with HTN, DM2, presenting with fevers and flank pain, found with 

hyponatremia, Leg Ag positive, and CT A/P concerning for PNA. Her stay 

complicated with new onset atrial fibrillation with RVR. 





Called to see patient at 8:30am for increasing oxygen demand and tachycardia 

with HR 140s, requiring 8L InO2 with spO2 91%.


Overnight, pt was febrile T 102 persistently.





Pt complained of difficulty breathing worsening.





PE:


Vital signs: /54mmhg, , RR 32, spO2 91% under 8L


H: normal S1S2, unable to hear clearly due to resp distress


L: clear on auscultation but increasing RR


A: soft non tender


Extremity: pulse present





Lab:


CRP and TWC downtrending





Assessment: 


1. Pneumonia


- concern of resp failure


- ABG stat-> patient refused second attempt after failing first one


- repeat blood culture


- portable CXR STAT


- add iv pip-tazo for broader coverage


- ICU transfer





2. Afib with RVR


- worsening with current infection


- consider diltiazem drip in ICU


 











- Patient Problems


(1) Legionella pneumonia


Current Visit: Yes   Status: Acute   Code(s): A48.1 - LEGIONNAIRES' DISEASE   

SNOMED Code(s): 352279971


   Comment: - CT proved right basal pneumonia with legionella positive


- fever still persisted but Tmax coming down


- continue iv azithromycin   





(2) Atrial fibrillation


Current Visit: Yes   Status: Acute   Code(s): I48.91 - UNSPECIFIED ATRIAL 

FIBRILLATION   SNOMED Code(s): 25540979


   Comment: - initially MAT, newly found AFib with RVR, BP borderline but still 

holding


- TTE no valvular changes


- continue diltizem 30mg q6h strictly, hold other antihypertensive


- Discussed with patient today about anticoagulation for Afib (GARRY-VAC score 2)

, started rivaroxiban 20mg qpm


   





(3) COPD exacerbation


Current Visit: Yes   Status: Acute   Code(s): J44.1 - CHRONIC OBSTRUCTIVE 

PULMONARY DISEASE W (ACUTE) EXACERBATION   SNOMED Code(s): 650137704


   Comment: - complicated with acute COPD exacerbation with ongoing pneumonia


- pred 40mg for 3 days while continuing Duoneb and mometaone   





(4) Nicotine dependence


Current Visit: Yes   Status: Acute   Code(s): F17.200 - NICOTINE DEPENDENCE, 

UNSPECIFIED, UNCOMPLICATED   SNOMED Code(s): 28230278


   Comment: start nicotine patch   





(5) DVT prophylaxis


Current Visit: Yes   Status: Acute   Code(s): Z29.9 - ENCOUNTER FOR 

PROPHYLACTIC MEASURES, UNSPECIFIED   SNOMED Code(s): 673519489


   Comment: - off levonox in view of initiation of NOAC   


Status and Disposition: 





Transfer to ICU

## 2019-09-25 LAB
ALBUMIN SERPL BCG-MCNC: 2.3 G/DL (ref 3.2–5.2)
ALBUMIN/GLOB SERPL: 1 {RATIO} (ref 1–3)
ALP SERPL-CCNC: 47 U/L (ref 34–104)
ALT SERPL W P-5'-P-CCNC: 27 U/L (ref 7–52)
ANION GAP SERPL CALC-SCNC: 8 MMOL/L (ref 2–11)
AST SERPL-CCNC: 43 U/L (ref 13–39)
BASOPHILS # BLD AUTO: 0 10^3/UL (ref 0–0.2)
BUN SERPL-MCNC: 12 MG/DL (ref 6–24)
BUN/CREAT SERPL: 30.8 (ref 8–20)
CALCIUM SERPL-MCNC: 7.6 MG/DL (ref 8.6–10.3)
CHLORIDE SERPL-SCNC: 104 MMOL/L (ref 101–111)
EOSINOPHIL # BLD AUTO: 0 10^3/UL (ref 0–0.6)
GLOBULIN SER CALC-MCNC: 2.3 G/DL (ref 2–4)
GLUCOSE SERPL-MCNC: 118 MG/DL (ref 70–100)
HCO3 SERPL-SCNC: 23 MMOL/L (ref 22–32)
HCT VFR BLD AUTO: 34 % (ref 35–47)
HGB BLD-MCNC: 11.4 G/DL (ref 12–16)
INR PPP/BLD: 1.13 (ref 0.82–1.09)
LYMPHOCYTES # BLD AUTO: 0.3 10^3/UL (ref 1–4.8)
MAGNESIUM SERPL-MCNC: 1.7 MG/DL (ref 1.9–2.7)
MCH RBC QN AUTO: 28 PG (ref 27–31)
MCHC RBC AUTO-ENTMCNC: 34 G/DL (ref 31–36)
MCV RBC AUTO: 81 FL (ref 80–97)
MONOCYTES # BLD AUTO: 0.2 10^3/UL (ref 0–0.8)
NEUTROPHILS # BLD AUTO: 9.4 10^3/UL (ref 1.5–7.7)
NRBC # BLD AUTO: 0 10^3/UL
NRBC BLD QL AUTO: 0
PLATELET # BLD AUTO: 187 10^3/UL (ref 150–450)
POTASSIUM SERPL-SCNC: 3 MMOL/L (ref 3.5–5)
PROT SERPL-MCNC: 4.6 G/DL (ref 6.4–8.9)
RBC # BLD AUTO: 4.17 10^6 /UL (ref 3.7–4.87)
SODIUM SERPL-SCNC: 135 MMOL/L (ref 135–145)
WBC # BLD AUTO: 9.9 10^3/UL (ref 3.5–10.8)

## 2019-09-25 RX ADMIN — DILTIAZEM HYDROCHLORIDE SCH MG: 30 TABLET, FILM COATED ORAL at 14:20

## 2019-09-25 RX ADMIN — CEFEPIME HYDROCHLORIDE SCH MLS/HR: 2 INJECTION, SOLUTION INTRAVENOUS at 18:53

## 2019-09-25 RX ADMIN — ACETAMINOPHEN PRN MG: 325 TABLET ORAL at 09:29

## 2019-09-25 RX ADMIN — DILTIAZEM HYDROCHLORIDE SCH MG: 30 TABLET, FILM COATED ORAL at 02:16

## 2019-09-25 RX ADMIN — INSULIN LISPRO SCH UNIT: 100 INJECTION, SOLUTION INTRAVENOUS; SUBCUTANEOUS at 07:52

## 2019-09-25 RX ADMIN — MOMETASONE FUROATE SCH PUFF: 220 INHALANT RESPIRATORY (INHALATION) at 09:23

## 2019-09-25 RX ADMIN — Medication SCH: at 18:36

## 2019-09-25 RX ADMIN — PIPERACILLIN AND TAZOBACTAM SCH MLS/HR: 3; .375 INJECTION, POWDER, LYOPHILIZED, FOR SOLUTION INTRAVENOUS; PARENTERAL at 05:45

## 2019-09-25 RX ADMIN — INSULIN LISPRO SCH: 100 INJECTION, SOLUTION INTRAVENOUS; SUBCUTANEOUS at 23:03

## 2019-09-25 RX ADMIN — ATORVASTATIN CALCIUM SCH MG: 10 TABLET, FILM COATED ORAL at 21:11

## 2019-09-25 RX ADMIN — PIPERACILLIN AND TAZOBACTAM SCH MLS/HR: 3; .375 INJECTION, POWDER, LYOPHILIZED, FOR SOLUTION INTRAVENOUS; PARENTERAL at 14:20

## 2019-09-25 RX ADMIN — GUAIFENESIN SCH MG: 600 TABLET, EXTENDED RELEASE ORAL at 07:53

## 2019-09-25 RX ADMIN — INSULIN LISPRO SCH UNIT: 100 INJECTION, SOLUTION INTRAVENOUS; SUBCUTANEOUS at 11:24

## 2019-09-25 RX ADMIN — ENOXAPARIN SODIUM SCH MG: 60 INJECTION SUBCUTANEOUS at 18:52

## 2019-09-25 RX ADMIN — DILTIAZEM HYDROCHLORIDE SCH MG: 30 TABLET, FILM COATED ORAL at 07:53

## 2019-09-25 RX ADMIN — NICOTINE SCH PATCH: 21 PATCH TRANSDERMAL at 07:53

## 2019-09-25 RX ADMIN — ENOXAPARIN SODIUM SCH MG: 60 INJECTION SUBCUTANEOUS at 05:45

## 2019-09-25 RX ADMIN — METOPROLOL TARTRATE PRN MG: 5 INJECTION, SOLUTION INTRAVENOUS at 04:45

## 2019-09-25 RX ADMIN — METOPROLOL TARTRATE PRN MG: 5 INJECTION, SOLUTION INTRAVENOUS at 21:17

## 2019-09-25 RX ADMIN — POTASSIUM CHLORIDE SCH MEQ: 1500 TABLET, EXTENDED RELEASE ORAL at 07:53

## 2019-09-25 RX ADMIN — AZITHROMYCIN SCH MLS/HR: 500 INJECTION, POWDER, LYOPHILIZED, FOR SOLUTION INTRAVENOUS at 23:12

## 2019-09-25 RX ADMIN — SODIUM CHLORIDE, SODIUM LACTATE, POTASSIUM CHLORIDE, AND CALCIUM CHLORIDE SCH MLS/HR: 600; 310; 30; 20 INJECTION, SOLUTION INTRAVENOUS at 14:25

## 2019-09-25 RX ADMIN — PREDNISONE SCH MG: 20 TABLET ORAL at 07:53

## 2019-09-25 RX ADMIN — WATER SCH NOTE: 100 INJECTION, SOLUTION INTRAVENOUS at 21:17

## 2019-09-25 RX ADMIN — GUAIFENESIN SCH MG: 600 TABLET, EXTENDED RELEASE ORAL at 21:11

## 2019-09-25 RX ADMIN — INSULIN LISPRO SCH UNIT: 100 INJECTION, SOLUTION INTRAVENOUS; SUBCUTANEOUS at 18:52

## 2019-09-25 RX ADMIN — DILTIAZEM HYDROCHLORIDE SCH MG: 30 TABLET, FILM COATED ORAL at 21:11

## 2019-09-25 RX ADMIN — MOMETASONE FUROATE SCH: 220 INHALANT RESPIRATORY (INHALATION) at 19:49

## 2019-09-25 NOTE — PN
Hospitalist Progress Note


Date of Service: 09/25/19


Subjective-


Pt still has complaint of difficulty to breathe. And said it was difficult to 

speak bc she was so thirsty





-Overnight event of HR in 120-150s. PRN metoprolol admisnistered. Fevers 

overnight to 100.5F PRN tylenol admisnstered





Objective-





vitals- 


Temp-97


Heart rate- 102 (1300)


resp rate-24


o2 sat-98 w/ FiO2 75%


02 flow rate-40


BP- 115/63





GENERAL: Pt appears very tired


EENT: oropharynx is dry


NECK:  Supple.  No jugular venous distention.


LUNGS:  The patient had rhonchi bilaterally


HEART:  no appreciable murmurs.


ABDOMEN:  Soft, nontender, nondistended.


EXTREMITIES:  No cyanosis, clubbing, or edema.





hgb- 11.4


hct-34


abs neutrophils-9.4(up)


abs lymphocytes- 0.3


INR-1.13


K+= 3.0


creatinine-.39


BUN/creatinine-30.8


glucose-118


Ca-7.6


Mg-1.7


indirect bili-.2


AST-43


total protein-4.6


albumin-2.3





Chest X-ray-stable right pleural effusion with right mid lower lung 

consolidation

















Assessment/Plan





Pt is a 64yr old female w/ past medical hx of diabetes, htn, dyslipidemia, 

osteoporosis, bornchitits, kidney stones, and multiple stents. Pt presented 

with fevers and flank pain with hyponatremia and is Legionella positive and CT 

shows PNA.





1.  Sepsis secondary to right lower lobe pneumonia due to Legionella 


   -Azithromycin 500mg in 250 mls IV and Piperacillin/Tazobactam 3.375mg 100mls 

@25 mls/hrs Q8H





2.  Hypoxic respiratory failure, likely secondary to pneumonia, questionable 

component of chronic obstructive 


pulmonary disease given the diffuse wheezing.  We will treat as the patient has 

chronic obstructive pulmonary 


disease exacerbation given history of chronic smoking with 42-pack-year 

history.  For now, we will start the patient 


on steroids.


-Prednisone 40 mg PO daily for COPD exacerbation


- Albuterol/ipratropium - 1 neb INH RT Q4 hrs PRN





3.  Has atrial fibrillation and [id number] risk of 2


   - diltiazem 30mg PO Q6H


   -Rivaroxaban 20mg PO QPM RIGOBERTO





4.  Hepatic steatosis likely secondary to nonalcoholic liver disease.





5.  Right adrenal nodule.  We will consider followup with an adrenal CT on 

discharge.





6.  History of diabetes.


   -Lispro sliding scale


   -Glipizide 5mg oral daily





7.  History of hypertension.  


   -diltiazem 30mg PO Q6H





8.  History of dyslipidemia.  


   atorvastatin-10mg po daily


9. Osteoporosis


   -alendronate 70 mg po weekly


10. DVT prophylaxis


   -rivoraxaban 20mg


11. Smoking cessation


   -pt agreed to nicotine patch 21mg(1 patch


12.)Tachycardia


   -Metoprolol 5mg IV Q6H PRN(2266)

## 2019-09-25 NOTE — PN
Date of Service: 09/25/19


Critical Care Services: 


Clinically improved today. Breathing easier. Less tachycardia. CXR shows no 

progression of RLL infltrate and R-sided effusion.





Vital Signs: 











Temp Pulse Resp BP SpO2 FiO2


 


97 F 109 21 117/79 97 70











Physical Exam: 


Gen:Alert, oriented, comfortable





HEENT:No adenopathy





Lungs:Crackles right base. rhonchi on left side





Cardiac:  Irreg rhythm





Abdomen:Not distended





Extremities:No cyanosis or edema











Fluid Balance (Past 24 Hours): 











 09/23/19 09/24/19 09/25/19





 06:59 06:59 06:59


 


Intake Total 3679 2596 5472


 


Output Total 0  1400


 


Balance 3679 2596 4072


 


Weight 140 lb  141 lb 9 oz


 


Intake:   


 


  IV Fluids 2349 1966 3763


 


    LR 2349 1966 3681


 


    NS (0.9%)   82


 


  IVPB 250  599


 


    ABX - AZITHROMYCIN   276


 


    ABX - ZOSYN   323


 


      


 


  Oral [id number]


 


Output:   


 


  Urine 0  1400


 


Other:   


 


  Estimated Void Medium  Large


 


  Date of Last Bowel   9/24/19





  Movement   


 


  # Bowel Movements 0  1


 


  Estimated Stool Amount   Small


 


  # Voids 1  1





 


                               


 


Labs: 


 Laboratory Results - last 24 hr











  09/24/19 09/24/19 09/24/19





  16:55 20:05 23:40


 


WBC   


 


RBC   


 


Hgb   


 


Hct   


 


MCV   


 


MCH   


 


MCHC   


 


RDW   


 


Plt Count   


 


MPV   


 


Neut % (Auto)   


 


Lymph % (Auto)   


 


Mono % (Auto)   


 


Eos % (Auto)   


 


Baso % (Auto)   


 


Absolute Neuts (auto)   


 


Absolute Lymphs (auto)   


 


Absolute Monos (auto)   


 


Absolute Eos (auto)   


 


Absolute Basos (auto)   


 


Absolute Nucleated RBC   


 


Nucleated RBC %   


 


INR (Anticoag Therapy)   


 


Patient Temperature    Not Reportable


 


ABG pH    7.47 H


 


ABG pH (Temp Correct)    Not Reportable


 


ABG pCO2    33 L


 


ABG pCO2 (Temp Corrct    Not Reportable


 


ABG pO2    73 L


 


ABG pO2 (Temp Correct    Not Reportable


 


ABG HCO3    25.6


 


ABG O2 Saturation    97.9


 


ABG Base Excess    0.9


 


Respiration Rate    Not Reportable


 


O2 Delivery Device    vapotherm


 


Ventilator Type    Not Reportable


 


Vent Mode    Not Reportable


 


FiO2    80


 


Inspiratory Time    Not Reportable


 


PEEP    Not Reportable


 


Pressure Support    Not Reportable


 


Pressure Control    Not Reportable


 


EPAP    Not Reportable


 


IPAP    Not Reportable


 


BiPAP    Not Reportable


 


Sodium   


 


Potassium   


 


Chloride   


 


Carbon Dioxide   


 


Anion Gap   


 


BUN   


 


Creatinine   


 


Est GFR ( Amer)   


 


Est GFR (Non-Af Amer)   


 


BUN/Creatinine Ratio   


 


Glucose   


 


POC Glucose (mg/dL)  210 H  142 H 


 


Lactic Acid   


 


Calcium   


 


Magnesium   


 


Total Bilirubin   


 


Direct Bilirubin   


 


Indirect Bilirubin   


 


AST   


 


ALT   


 


Alkaline Phosphatase   


 


Total Protein   


 


Albumin   


 


Globulin   


 


Albumin/Globulin Ratio   














  09/25/19 09/25/19 09/25/19





  05:35 05:35 05:35


 


WBC  9.9  


 


RBC  4.17  


 


Hgb  11.4 L  


 


Hct  34 L  


 


MCV  81  


 


MCH  28  


 


MCHC  34  


 


RDW  14  


 


Plt Count  187  


 


MPV  8.2  


 


Neut % (Auto)  94.1  


 


Lymph % (Auto)  3.3  


 


Mono % (Auto)  2.4  


 


Eos % (Auto)  0.1  


 


Baso % (Auto)  0.1  


 


Absolute Neuts (auto)  9.4 H  


 


Absolute Lymphs (auto)  0.3 L  


 


Absolute Monos (auto)  0.2  


 


Absolute Eos (auto)  0.0  


 


Absolute Basos (auto)  0.0  


 


Absolute Nucleated RBC  0.0  


 


Nucleated RBC %  0.0  


 


INR (Anticoag Therapy)   1.13 H 


 


Patient Temperature   


 


ABG pH   


 


ABG pH (Temp Correct)   


 


ABG pCO2   


 


ABG pCO2 (Temp Corrct   


 


ABG pO2   


 


ABG pO2 (Temp Correct   


 


ABG HCO3   


 


ABG O2 Saturation   


 


ABG Base Excess   


 


Respiration Rate   


 


O2 Delivery Device   


 


Ventilator Type   


 


Vent Mode   


 


FiO2   


 


Inspiratory Time   


 


PEEP   


 


Pressure Support   


 


Pressure Control   


 


EPAP   


 


IPAP   


 


BiPAP   


 


Sodium    135


 


Potassium    3.0 L


 


Chloride    104


 


Carbon Dioxide    23


 


Anion Gap    8


 


BUN    12


 


Creatinine    0.39 L


 


Est GFR ( Amer)    201.5


 


Est GFR (Non-Af Amer)    166.5


 


BUN/Creatinine Ratio    30.8 H


 


Glucose    118 H


 


POC Glucose (mg/dL)   


 


Lactic Acid   


 


Calcium    7.6 L


 


Magnesium    1.7 L


 


Total Bilirubin    0.30


 


Direct Bilirubin    0.10


 


Indirect Bilirubin    0.2 L


 


AST    43 H


 


ALT    27


 


Alkaline Phosphatase    47


 


Total Protein    4.6 L


 


Albumin    2.3 L


 


Globulin    2.3


 


Albumin/Globulin Ratio    1.0














  09/25/19 09/25/19 09/25/19





  07:38 08:44 11:14


 


WBC   


 


RBC   


 


Hgb   


 


Hct   


 


MCV   


 


MCH   


 


MCHC   


 


RDW   


 


Plt Count   


 


MPV   


 


Neut % (Auto)   


 


Lymph % (Auto)   


 


Mono % (Auto)   


 


Eos % (Auto)   


 


Baso % (Auto)   


 


Absolute Neuts (auto)   


 


Absolute Lymphs (auto)   


 


Absolute Monos (auto)   


 


Absolute Eos (auto)   


 


Absolute Basos (auto)   


 


Absolute Nucleated RBC   


 


Nucleated RBC %   


 


INR (Anticoag Therapy)   


 


Patient Temperature   


 


ABG pH   


 


ABG pH (Temp Correct)   


 


ABG pCO2   


 


ABG pCO2 (Temp Corrct   


 


ABG pO2   


 


ABG pO2 (Temp Correct   


 


ABG HCO3   


 


ABG O2 Saturation   


 


ABG Base Excess   


 


Respiration Rate   


 


O2 Delivery Device   


 


Ventilator Type   


 


Vent Mode   


 


FiO2   


 


Inspiratory Time   


 


PEEP   


 


Pressure Support   


 


Pressure Control   


 


EPAP   


 


IPAP   


 


BiPAP   


 


Sodium   


 


Potassium   


 


Chloride   


 


Carbon Dioxide   


 


Anion Gap   


 


BUN   


 


Creatinine   


 


Est GFR ( Amer)   


 


Est GFR (Non-Af Amer)   


 


BUN/Creatinine Ratio   


 


Glucose   


 


POC Glucose (mg/dL)  135 H   141 H


 


Lactic Acid   3.3 H* 


 


Calcium   


 


Magnesium   


 


Total Bilirubin   


 


Direct Bilirubin   


 


Indirect Bilirubin   


 


AST   


 


ALT   


 


Alkaline Phosphatase   


 


Total Protein   


 


Albumin   


 


Globulin   


 


Albumin/Globulin Ratio   











Studies: 


Sputum culture growing Staph but Gram's stain shows mostly epithelial cells, so 

doubt this is a reliable specimen.





Impression: 


1. Clinically improving


2. Positive fluid balance


3. Hypomagnesemia





Plan: 


1. Continue present antibiotic regimen


2. Cut back on IV fluids


3. Replace magnesium to help with AFib


4. ID service evaluating patient

## 2019-09-25 NOTE — CONS
CONSULTATION REPORT:

 

DATE OF CONSULT:  19

 

PRIMARY CARE PROVIDER:  Dr. Rayna Hillman

 

PROVIDER REQUESTING CONSULTATION:  Dr. Per Esparza

 

CONSULTING SERVICE:  Infectious Disease.

 

PROVIDER:  Luisa Kline NP

 

ATTENDING PROVIDER:  Dr. Barber Bowers.* (DICTATED BY LUISA KLINE NP)

 

REASON FOR CONSULTATION:  Legionella pneumonia.

 

HISTORY OF PRESENT ILLNESS:  Ms. Cortes is a 64-year-old female with past 
medical history significant for diabetes mellitus, type 2; hypertension; 
hyperlipidemia; osteoporosis; renal calculi; and suspected COPD, who presented 
to the emergency room with complaints of fever and back pain.  She states that 
earlier last week, she was feeling fatigued and run down, but on Thursday, , she started feeling really sick including fevers and shortness of breath 
with exertion.  Her 17-year-old grandson that lives with her had recently been 
having signs of an upper respiratory tract infection.  Her partner has been 
feeling well with no similar symptoms.  Denies any joint pain, muscle pain, 
nausea, vomiting diarrhea, abdominal pain, urinary symptoms, rash, or recent 
travel.  Denies any chest pain.  She developed intermittent fevers and felt 
dehydrated, so she presented to the emergency room for evaluation of her 
symptoms.

 

While in the emergency room, she had a chest x-ray showing no acute 
cardiopulmonary process.  She also underwent an abdomen and pelvis CT showing a 
right lower lobe pneumonia, moderate hepatic steatosis, and intermediate right 
adrenal nodule with recommended followup.  She was noted to be febrile with a 
temperature of 102.1 upon arrival to the emergency room.  She was tachycardic 
with her heart rate in the 130s, O2 sat 95% on room air.  She was also noted to 
have mild leukocytosis with a white blood cell count of 12.2, hyponatremic with 
a sodium of 125, elevated troponin, and CRP of 399.05.  Urinalysis with 2+ blood
, negative nitrites, negative leukocytes, absent bacteria.  She was referred to 
the hospitalist service for admission.

 

She was admitted to the hospital for a right lower lobe pneumonia.  She was 
placed on ceftriaxone and azithromycin to cover community-acquired pneumonia.  
She was also found to have hypoxic respiratory failure, felt to be secondary to 
her pneumonia and a questionable component of COPD.  The patient was started 
steroids and DuoNebs.  The patient has continued to require Vapotherm to 
maintain her oxygenation.  She continues to be tachycardic and tachypneic.  
Additionally, she continues to have intermittent fevers, low-grade fevers 
overnight and this morning, last fever yesterday afternoon at 101.2.  She was 
102.6 earlier yesterday morning.  Her leukocytosis has resolved.  She had a 
followup chest x-ray showing a right mid lower lung consolidation with a right 
pleural effusion.  She had a transthoracic echocardiogram showing a normal 
ejection fraction and no valvular abnormalities noted.  She is currently 
requiring Vapotherm at 40 L and 75% FiO2.  She has dyspnea with talking.  She 
continues to have intermittent fevers.  She had urine antigen positive for 
Legionella.  Blood cultures with no growth to date.  A urine culture was 
negative.  Sputum culture obtained yesterday showing a blood tinged specimen 
with 2+ neutrophils, 4+ epithelial cells, and mixed morphotypes resembling 
normal maury, final culture is pending at this time.

 

IMPRESSION:

1.  Legionella pneumonia.  The patient with urine antigen positive for 
Legionella, negative for S. pneumoniae.  Blood cultures with no growth on day 
3. She continues to be intermittently febrile.  She had sputum culture with 2+ 
neutrophils, 4+ epithelial cells, final culture is still pending.  She has been 
on the azithromycin for 4 days and Zosyn for 1 day.  Initial chest x-ray in the 
emergency room with no acute findings, but abdomen CT found basilar right 
consolidation.  The patient had ultrasound of her chest yesterday showing a 
small right pleural effusion.  This morning, chest x-ray showing a stable right 
pleural effusion of the right mid and lower lobe consolidation.  Her initial 
leukocytosis has resolved.  CRP on admission is 399.5, and it has not been 
repeated.  The intensivist has concern for a loculated pleural effusion.

2.  Tobacco abuse.

3.  Diabetes mellitus, type 2.

 

PLAN/RECOMMENDATIONS:

Recommend continuing Azithromycin 500 mg IV daily to treat Legionella 
pneumonia. Will broaden the antibiotic coverage in the setting of staph aureus 
in the sputum. Will discontinue Zosyn and start Vancomycin trough goal 15-20 
and Cefepime 2gm IV every 8 hours. Will discuss chest imaging with Radiology 
and see if they feel the pleural effusion is loculated. If it is in fact a 
small pleural effusion without loculation will continue current course. If the 
pleural effusion is loculated will see if Radiology is able to aspirate this 
for a sample. We will continue to follow with the patient and further 
recommendations will be based on the patients clinical course and microbiology 
results. 



PAST MEDICAL HISTORY:

1.  Diabetes mellitus, type 2.

2.  Hypertension.

3.  Hyperlipidemia.

4.  Osteoporosis.

5.  History of renal calculi.

6.  Possible history of COPD.

 

PAST SURGICAL HISTORY:

1.  Status post  section.

2.  Status post multiple renal stents.

 

MEDICATIONS:  Home medications:

1.  Glipizide 5 mg by mouth daily.

2.  Losartan 25 mg by mouth twice daily.

3.  Flovent HFA inhaler 220 mg 2 puffs inhalation twice daily.

4.  Atorvastatin 10 mg by mouth daily.

5.  Alendronate 70 mg by mouth weekly.

6.  Albuterol HFA inhaler 2 puffs inhalation every 6 hours as needed for 
shortness of breath or wheeze.

 

Hospital medications:

1.  Acetaminophen 650 mg by mouth every 4 hours as needed for fever or pain.

2.  DuoNeb 1 neb inhalation every 4 hours while awake as needed for shortness 
of breath or wheeze.

3.  Atorvastatin 10 mg by mouth daily at bedtime.

4.  Azithromycin 500 mg IV daily.

5.  Tessalon 100 mg by mouth twice daily as needed for cough.

6.  Dextrose 25 mL IV push for glucose less than 60 as needed.

7.  Diltiazem 30 mg by mouth every 6 hours.

8.  Lovenox 60 mg subcutaneous twice daily.

9.  Mucinex 600 mg by mouth twice daily.

10.  Humalog insulin subcutaneous with meals and at bedtime.

11.  Lactated Ringers 125 mL intravenously an hour.

12.  Metoprolol tartrate 5 mg IV every 6 hours as needed for heart rates 
greater than 120.

13.  Asmanex 220 mcg MDI 2 puffs inhalation twice daily.

14.  Nicotine patch 21 mg transdermal daily.

15.  Zosyn 3.375 g IV every 8 hours.

16.  Potassium chloride 20 mEq by mouth daily.

 

ALLERGIES:  No known drug allergies.

 

FAMILY HISTORY:  Denies family history of recurrent or resistant infections.  
Denies family history of coronary artery disease or diabetes.  Parents with a 
history of lung cancer.  Mother passed at age 78 and father passed at age 68.

 

SOCIAL HISTORY:  Denies alcohol or recreational drug use.  She is a current 
smoker, smoking 1 pack a day for many years.

 

REVIEW OF SYSTEMS:  I performed a 10-point review of systems, all the pertinent 
positives and negatives are mentioned in the history of present illness.  The 
remaining review of systems are negative.

 

PHYSICAL EXAMINATION:  Vital Signs:  Temperature 100.9, heart rate 155, 
respiratory rate 30, O2 sat 94% on 30 L of oxygen and 75% FiO2, blood pressure 
166/89.  General Appearance:  The patient is alert, ill appearing, appears to 
be in no acute distress, sitting up in bed.  Head:  Normocephalic, atraumatic.  
Extraocular movements are intact.  Moist mucous membranes.  No subconjunctival 
hemorrhage. Neurological:  Alert and oriented.  Cranial nerves II through XII 
are grossly intact.  Cardiovascular:  Heart rate is regular, but tachycardic.  
No murmurs, rubs, or gallops heard.  Respiratory:  No accessory muscle use.  
Lungs with scattered rhonchi, diminished in the bases.  Abdomen:  Bowel sounds 
present. Abdomen soft, nontender, nondistended.  Extremities:  No lower 
extremity edema. DP/PT pulses are 2+.  Musculoskeletal:  No clubbing or 
cyanosis noted.  Exhibits good strength in all extremities.  Psychological:  
Calm and cooperative.  Skin:  No rashes or abnormalities seen.

 

DIAGNOSTIC STUDIES/LABORATORY DATA:  BMP as from 19, sodium 135, 
potassium 3.0, chloride 104, CO2 of 23, BUN 12, creatinine 0.39, glucose 118.  
White blood cell count 9.9, hemoglobin 11.4, hematocrit 34, platelet count 187.

 

Please see impression and recommendations outlined above, recommendations have 
been discussed with Dr. Per Esparza.

 

Thank you for asking us to see Ms. Cortes in consultation.

 

This case has been reviewed with my attending, Dr. Barber Bowers, who agrees 
with the plan of care.

 

Reviewed by MARC GIRALDO  19 1056 

 

[id number] #: [id number]

[facility], Ear [facility] Throat HospitalSULEMAN

## 2019-09-26 LAB
BASOPHILS # BLD AUTO: 0 10^3/UL (ref 0–0.2)
EOSINOPHIL # BLD AUTO: 0 10^3/UL (ref 0–0.6)
HCT VFR BLD AUTO: 33 % (ref 35–47)
HGB BLD-MCNC: 11.3 G/DL (ref 12–16)
LYMPHOCYTES # BLD AUTO: 0.4 10^3/UL (ref 1–4.8)
MCH RBC QN AUTO: 27 PG (ref 27–31)
MCHC RBC AUTO-ENTMCNC: 34 G/DL (ref 31–36)
MCV RBC AUTO: 81 FL (ref 80–97)
MONOCYTES # BLD AUTO: 0.3 10^3/UL (ref 0–0.8)
NEUTROPHILS # BLD AUTO: 9.7 10^3/UL (ref 1.5–7.7)
NRBC # BLD AUTO: 0 10^3/UL
NRBC BLD QL AUTO: 0.1
PLATELET # BLD AUTO: 230 10^3/UL (ref 150–450)
RBC # BLD AUTO: 4.12 10^6 /UL (ref 3.7–4.87)
WBC # BLD AUTO: 10.4 10^3/UL (ref 3.5–10.8)

## 2019-09-26 RX ADMIN — GUAIFENESIN SCH MG: 600 TABLET, EXTENDED RELEASE ORAL at 08:56

## 2019-09-26 RX ADMIN — METOPROLOL TARTRATE SCH MG: 5 INJECTION, SOLUTION INTRAVENOUS at 20:50

## 2019-09-26 RX ADMIN — INSULIN LISPRO SCH UNIT: 100 INJECTION, SOLUTION INTRAVENOUS; SUBCUTANEOUS at 08:55

## 2019-09-26 RX ADMIN — CEFEPIME HYDROCHLORIDE SCH MLS/HR: 2 INJECTION, SOLUTION INTRAVENOUS at 10:37

## 2019-09-26 RX ADMIN — ENOXAPARIN SODIUM SCH MG: 60 INJECTION SUBCUTANEOUS at 06:04

## 2019-09-26 RX ADMIN — AZITHROMYCIN SCH MLS/HR: 500 INJECTION, POWDER, LYOPHILIZED, FOR SOLUTION INTRAVENOUS at 23:01

## 2019-09-26 RX ADMIN — VANCOMYCIN HYDROCHLORIDE SCH MLS/HR: 1 INJECTION, POWDER, LYOPHILIZED, FOR SOLUTION INTRAVENOUS at 14:29

## 2019-09-26 RX ADMIN — DILTIAZEM HYDROCHLORIDE SCH MG: 30 TABLET, FILM COATED ORAL at 02:09

## 2019-09-26 RX ADMIN — Medication SCH: at 13:13

## 2019-09-26 RX ADMIN — INSULIN LISPRO SCH UNIT: 100 INJECTION, SOLUTION INTRAVENOUS; SUBCUTANEOUS at 21:01

## 2019-09-26 RX ADMIN — METOPROLOL TARTRATE SCH MG: 5 INJECTION, SOLUTION INTRAVENOUS at 15:14

## 2019-09-26 RX ADMIN — VANCOMYCIN HYDROCHLORIDE SCH MLS/HR: 1 INJECTION, POWDER, LYOPHILIZED, FOR SOLUTION INTRAVENOUS at 20:50

## 2019-09-26 RX ADMIN — MOMETASONE FUROATE SCH PUFF: 220 INHALANT RESPIRATORY (INHALATION) at 08:11

## 2019-09-26 RX ADMIN — POTASSIUM CHLORIDE SCH MEQ: 1500 TABLET, EXTENDED RELEASE ORAL at 08:56

## 2019-09-26 RX ADMIN — METOPROLOL TARTRATE SCH MG: 5 INJECTION, SOLUTION INTRAVENOUS at 08:56

## 2019-09-26 RX ADMIN — Medication SCH: at 17:44

## 2019-09-26 RX ADMIN — METOPROLOL TARTRATE SCH MG: 5 INJECTION, SOLUTION INTRAVENOUS at 02:09

## 2019-09-26 RX ADMIN — INSULIN LISPRO SCH UNIT: 100 INJECTION, SOLUTION INTRAVENOUS; SUBCUTANEOUS at 13:21

## 2019-09-26 RX ADMIN — CEFEPIME HYDROCHLORIDE SCH MLS/HR: 2 INJECTION, SOLUTION INTRAVENOUS at 02:09

## 2019-09-26 RX ADMIN — ENOXAPARIN SODIUM SCH MG: 60 INJECTION SUBCUTANEOUS at 18:10

## 2019-09-26 RX ADMIN — INSULIN LISPRO SCH UNIT: 100 INJECTION, SOLUTION INTRAVENOUS; SUBCUTANEOUS at 18:09

## 2019-09-26 RX ADMIN — NICOTINE SCH PATCH: 21 PATCH TRANSDERMAL at 08:55

## 2019-09-26 RX ADMIN — Medication SCH: at 02:09

## 2019-09-26 RX ADMIN — VANCOMYCIN HYDROCHLORIDE SCH MLS/HR: 1 INJECTION, POWDER, LYOPHILIZED, FOR SOLUTION INTRAVENOUS at 06:04

## 2019-09-26 RX ADMIN — AMIODARONE HYDROCHLORIDE SCH MLS/HR: 1.8 INJECTION, SOLUTION INTRAVENOUS at 21:01

## 2019-09-26 RX ADMIN — DILTIAZEM HYDROCHLORIDE SCH MG: 30 TABLET, FILM COATED ORAL at 08:56

## 2019-09-26 RX ADMIN — ATORVASTATIN CALCIUM SCH MG: 10 TABLET, FILM COATED ORAL at 20:50

## 2019-09-26 RX ADMIN — WATER SCH NOTE: 100 INJECTION, SOLUTION INTRAVENOUS at 20:51

## 2019-09-26 NOTE — PN
Date of Service: 09/26/19


Critical Care Services: 


Continues to do well clinically - remains on high-flow nasal O2 but we are 

slowly decreasing the FIO2. AFib has been poorly controlled on diltiazem so we 

will try amiodarone.





Vital Signs: 











Temp Pulse Resp BP SpO2 FiO2


 


100.9 F 113 31 159/74 97 60








Tmax past 24 hrs = 100.9F








Physical Exam: 


Gen:Alert, orientd, comfortable





HEENT: No adenopathy





Lungs: Crackles right base. No wheezes





Cardiac:  Irreg rhythm





Abdomen:Not distended





Extremities: No cyanosis or edema








Fluid Balance (Past 24 Hours): 











 09/24/19 09/25/19 09/26/19





 06:59 06:59 06:59


 


Intake Total 2596 5472 4408


 


Output Total  1400 1850


 


Balance 2596 4072 2558


 


Weight  141 lb 9 oz 141 lb 4.8 oz


 


Intake:   


 


  IV Fluids 1966 3763 3628


 


    LR 1966 3681 3083


 


    NS (0.9%)  82 545


 


  IVPB  599 


 


    ABX - AZITHROMYCIN  276 


 


    ABX - ZOSYN  323 


 


  Oral 630 1110 780


 


Output:   


 


  Urine  1400 1850


 


Other:   


 


  Estimated Void  Large Large


 


  Date of Last Bowel  9/24/19 





  Movement   


 


  # Bowel Movements  1 


 


  Estimated Stool Amount  Small 


 


  # Voids  1 1





 





Labs: 


 Laboratory Results - last 24 hr











  09/26/19





  05:28


 


WBC 


 


RBC 


 


Hgb 


 


Hct 


 


MCV 


 


MCH 


 


MCHC 


 


RDW 


 


Plt Count 


 


MPV 


 


Neut % (Auto) 


 


Lymph % (Auto) 


 


Mono % (Auto) 


 


Eos % (Auto) 


 


Baso % (Auto) 


 


Absolute Neuts (auto) 


 


Absolute Lymphs (auto) 


 


Absolute Monos (auto) 


 


Absolute Eos (auto) 


 


Absolute Basos (auto) 


 


Absolute Nucleated RBC 


 


Nucleated RBC % 


 


POC Glucose (mg/dL) 


 


C-Reactive Protein  314.43 H














  09/26/19 09/26/19





  05:28 07:35


 


WBC  10.4 


 


RBC  4.12 


 


Hgb  11.3 L 


 


Hct  33 L 


 


MCV  81 


 


MCH  27 


 


MCHC  34 


 


RDW  14 


 


Plt Count  230 


 


MPV  8.3 


 


Neut % (Auto)  93.1 


 


Lymph % (Auto)  3.7 


 


Mono % (Auto)  3.0 


 


Eos % (Auto)  0.0 


 


Baso % (Auto)  0.2 


 


Absolute Neuts (auto)  9.7 H 


 


Absolute Lymphs (auto)  0.4 L 


 


Absolute Monos (auto)  0.3 


 


Absolute Eos (auto)  0.0 


 


Absolute Basos (auto)  0.0 


 


Absolute Nucleated RBC  0.0 


 


Nucleated RBC %  0.1 


 


POC Glucose (mg/dL)   155 H











Studies: 


CXR: pending





Nutrition: 


Oral diet - intake good





Impression: 


1. Looks better clinically, but the persistence of fever and elevated CRP is 

concerning.


2. AFib poorly controlled on diltiazem





Plan: 


1. CT scan of chest to look at the pleural effusion. If fever persists, we need 

to tap the effusion to look for a complicated parapneumonic effusion (e.g.,

empyema). 


2. Amiodarone infusion for the AFib








Critical Care Time: 35 minutes (including time to discuss case with infectious 

disease consultant).

## 2019-09-27 LAB
BASOPHILS # BLD AUTO: 0 10^3/UL (ref 0–0.2)
BUN SERPL-MCNC: 9 MG/DL (ref 6–24)
EOSINOPHIL # BLD AUTO: 0 10^3/UL (ref 0–0.6)
HCT VFR BLD AUTO: 31 % (ref 35–47)
HGB BLD-MCNC: 10.6 G/DL (ref 12–16)
LYMPHOCYTES # BLD AUTO: 0.5 10^3/UL (ref 1–4.8)
MCH RBC QN AUTO: 28 PG (ref 27–31)
MCHC RBC AUTO-ENTMCNC: 34 G/DL (ref 31–36)
MCV RBC AUTO: 80 FL (ref 80–97)
MONOCYTES # BLD AUTO: 0.4 10^3/UL (ref 0–0.8)
MONOCYTES NFR FLD: 39 %
NEUTROPHILS # BLD AUTO: 7.5 10^3/UL (ref 1.5–7.7)
NRBC # BLD AUTO: 0 10^3/UL
NRBC BLD QL AUTO: 0
PLATELET # BLD AUTO: 242 10^3/UL (ref 150–450)
RBC # BLD AUTO: 3.88 10^6 /UL (ref 3.7–4.87)
WBC # BLD AUTO: 8.4 10^3/UL (ref 3.5–10.8)

## 2019-09-27 PROCEDURE — 0W993ZX DRAINAGE OF RIGHT PLEURAL CAVITY, PERCUTANEOUS APPROACH, DIAGNOSTIC: ICD-10-PCS | Performed by: INTERNAL MEDICINE

## 2019-09-27 RX ADMIN — METOPROLOL TARTRATE SCH: 5 INJECTION, SOLUTION INTRAVENOUS at 17:22

## 2019-09-27 RX ADMIN — ENOXAPARIN SODIUM SCH MG: 60 INJECTION SUBCUTANEOUS at 05:43

## 2019-09-27 RX ADMIN — VANCOMYCIN HYDROCHLORIDE SCH MLS/HR: 1 INJECTION, POWDER, LYOPHILIZED, FOR SOLUTION INTRAVENOUS at 05:42

## 2019-09-27 RX ADMIN — POTASSIUM CHLORIDE SCH MEQ: 1500 TABLET, EXTENDED RELEASE ORAL at 08:04

## 2019-09-27 RX ADMIN — INSULIN LISPRO SCH UNIT: 100 INJECTION, SOLUTION INTRAVENOUS; SUBCUTANEOUS at 21:42

## 2019-09-27 RX ADMIN — INSULIN LISPRO SCH UNIT: 100 INJECTION, SOLUTION INTRAVENOUS; SUBCUTANEOUS at 08:04

## 2019-09-27 RX ADMIN — Medication SCH: at 12:56

## 2019-09-27 RX ADMIN — ENOXAPARIN SODIUM SCH MG: 60 INJECTION SUBCUTANEOUS at 18:42

## 2019-09-27 RX ADMIN — Medication SCH: at 00:04

## 2019-09-27 RX ADMIN — ATORVASTATIN CALCIUM SCH MG: 10 TABLET, FILM COATED ORAL at 20:38

## 2019-09-27 RX ADMIN — METOPROLOL TARTRATE SCH MG: 5 INJECTION, SOLUTION INTRAVENOUS at 08:04

## 2019-09-27 RX ADMIN — POTASSIUM CHLORIDE SCH MEQ: 1500 TABLET, EXTENDED RELEASE ORAL at 08:18

## 2019-09-27 RX ADMIN — VANCOMYCIN HYDROCHLORIDE SCH MLS/HR: 1 INJECTION, POWDER, LYOPHILIZED, FOR SOLUTION INTRAVENOUS at 22:03

## 2019-09-27 RX ADMIN — INSULIN LISPRO SCH UNIT: 100 INJECTION, SOLUTION INTRAVENOUS; SUBCUTANEOUS at 18:42

## 2019-09-27 RX ADMIN — AMIODARONE HYDROCHLORIDE SCH MLS/HR: 1.8 INJECTION, SOLUTION INTRAVENOUS at 07:37

## 2019-09-27 RX ADMIN — VANCOMYCIN HYDROCHLORIDE SCH MLS/HR: 1 INJECTION, POWDER, LYOPHILIZED, FOR SOLUTION INTRAVENOUS at 13:39

## 2019-09-27 RX ADMIN — METOPROLOL TARTRATE SCH: 5 INJECTION, SOLUTION INTRAVENOUS at 20:38

## 2019-09-27 RX ADMIN — Medication SCH: at 18:42

## 2019-09-27 RX ADMIN — INSULIN LISPRO SCH UNIT: 100 INJECTION, SOLUTION INTRAVENOUS; SUBCUTANEOUS at 12:55

## 2019-09-27 RX ADMIN — AMIODARONE HYDROCHLORIDE SCH MG: 200 TABLET ORAL at 13:38

## 2019-09-27 RX ADMIN — NICOTINE SCH PATCH: 21 PATCH TRANSDERMAL at 08:04

## 2019-09-27 RX ADMIN — METOPROLOL TARTRATE SCH MG: 5 INJECTION, SOLUTION INTRAVENOUS at 01:40

## 2019-09-27 NOTE — PN
Date of Service: 09/27/19


Critical Care Services: 


Doing well today - is off vapotherm and on nasal O2 at 5 L/min. Last fever was T

=102 at 4PM yesterday. Other problem is AFib, which is rate controlled on 

amiodarone (switched from IV to PO). 





Vital Signs: 











Temp Pulse Resp BP SpO2 FiO2


 


98.9 F 92 24 136/82 97 40











Physical Exam: 


Gen:Alert, oriented, comfortable





HEENT: No adenopathy





Lungs:Decreased BS and crackles right base. No wheezes





Cardiac:  Irreg rhythm. No rub





Abdomen:Not distended





Extremities: No cyanosis. Trace edema.








Fluid Balance (Past 24 Hours): 











 09/25/19 09/26/19 09/27/19





 06:59 06:59 06:59


 


Intake Total 5472 4408 2844


 


Output Total 1400 1850 3050


 


Balance 4072 2558 -206


 


Weight 141 lb 9 oz 141 lb 4.8 oz 142 lb 9 oz


 


Intake:   


 


  IV Fluids 3763 3628 1909


 


    ABX - AZITHROMYCIN   120


 


    ABX - VANCOMYCIN   250


 


    LR 3681 3083 936


 


    NS (0.9%) 82 545 603


 


  IVPB 599  


 


    ABX - AZITHROMYCIN 276  


 


    ABX - ZOSYN 323  


 


  Medicated IV   205


 


    CC - Amiodarone   205


 


  Oral 1110 780 730


 


Output:   


 


  Urine 1400 1850 3050


 


Other:   


 


  Estimated Void Large Large Large


 


  Date of Last Bowel 9/24/19  





  Movement   


 


  # Bowel Movements 1  


 


  Estimated Stool Amount Small  


 


  # Voids 1 1 1





 





Labs: 


 Laboratory Results - last 24 hr











  09/26/19 09/26/19 09/27/19





  17:34 20:55 04:18


 


WBC   


 


RBC   


 


Hgb   


 


Hct   


 


MCV   


 


MCH   


 


MCHC   


 


RDW   


 


Plt Count   


 


MPV   


 


Neut % (Auto)   


 


Lymph % (Auto)   


 


Mono % (Auto)   


 


Eos % (Auto)   


 


Baso % (Auto)   


 


Absolute Neuts (auto)   


 


Absolute Lymphs (auto)   


 


Absolute Monos (auto)   


 


Absolute Eos (auto)   


 


Absolute Basos (auto)   


 


Absolute Nucleated RBC   


 


Nucleated RBC %   


 


BUN    9


 


Creatinine    0.38 L


 


Est GFR ( Amer)    207.6


 


Est GFR (Non-Af Amer)    171.6


 


POC Glucose (mg/dL)  208 H  176 H 


 


C-Reactive Protein    259.22 H


 


Vancomycin Trough    7.8














  09/27/19 09/27/19 09/27/19





  04:18 07:53 12:44


 


WBC  8.4  


 


RBC  3.88  


 


Hgb  10.6 L  


 


Hct  31 L  


 


MCV  80  


 


MCH  28  


 


MCHC  34  


 


RDW  14  


 


Plt Count  242  


 


MPV  8.0  


 


Neut % (Auto)  89.4  


 


Lymph % (Auto)  5.9  


 


Mono % (Auto)  4.4  


 


Eos % (Auto)  0.2  


 


Baso % (Auto)  0.1  


 


Absolute Neuts (auto)  7.5  


 


Absolute Lymphs (auto)  0.5 L  


 


Absolute Monos (auto)  0.4  


 


Absolute Eos (auto)  0.0  


 


Absolute Basos (auto)  0.0  


 


Absolute Nucleated RBC  0.0  


 


Nucleated RBC %  0.0  


 


BUN   


 


Creatinine   


 


Est GFR ( Amer)   


 


Est GFR (Non-Af Amer)   


 


POC Glucose (mg/dL)   143 H  175 H


 


C-Reactive Protein   


 


Vancomycin Trough   











Studies: 


CT of thorax shows a large right-sided pleural effusion and a dense 

consolidation of the right lower lobe. There is also a small pericardial 

effusion





Nutrition: 


Reg diet - intake good





Impression: 


1. Legionnaire's pneumonia


2. Parapneumonic effusion - ? complicated effusion (because of persistence of 

fever)


3. MRSA in sputum culture, but doubt significance because specimen contained 

numerous squamous epithelial cells from the mouth.


4. Acute AFib - probably from an acute myocarditis





Plan: 


1. IR service will tap the right chest for pleural fluid and we will send fluid 

for Gram's stain, culture, pH, and LDH.


2. Continue current antibiotics.


3. Continue oral amiodarone


4. Transfer out of ICU





ID service is actively following.








Critical Care Time: 40 minutes (including time discussing case with IR service).

## 2019-09-27 NOTE — PN
Hospitalist Progress Note


Date of Service: 09/27/19





Subjective-


Pt still has no complaints and feels much better





-no overnight events


Objective-





vitals- 


Temp-99.8


Heart rate- 91


resp rate-23


o2 sat-97


BP- 136/82


comments- Pt is off vapotherm and is on nasal O2 at 5L/min





GENERAL: Pt appears well


EENT: oropharynx is dry


NECK:  Supple.  No jugular venous distention.


LUNGS:  The patient had rhonchi bilaterally


HEART:  no appreciable murmurs. (irregular rhythm)


ABDOMEN:  Soft, nontender, nondistended.


EXTREMITIES:  No cyanosis, clubbing, or edema.





hgb- 10.6


hct-31


abs lymphocytes- 0.5


INR-1.13


K+= 3.0


creatinine-.38


BUN-9 (normal)


glucose-143


CRP-259.22





Chest CT- right lower lobe consolidation, patchy airspace disease of left upper 

lobe, right middle lobe and left lower lobe atelectasis, large right pleural 

effusion, small left pleural effusion, small pericardial effusion





Chest u/s-right pleural effusion w/ right lower lobe consolidation





u/s thoracocentesis- culture sent(sending for gram stain, culture, pH, and LDH)

















Assessment/Plan





Pt is a 64yr old female w/ past medical hx of diabetes, htn, dyslipidemia, 

osteoporosis, bornchitits, kidney stones, and multiple stents. Pt presented 

with fevers and flank pain with hyponatremia and is Legionella positive and CT 

shows PNA.





1.  Sepsis secondary to right lower lobe pneumonia due to Legionella with 

concern for complicated parapneumonic effusion.


   -Azithromycin 500mg in 250 mls IV and Vancomycin 1,250mg 250mls @166 mls/hr 

IV Q8h


   -u/s thoracocentesis to look at pleural effusion. To look for complicated 

parapneumonic effusion(empyema)





2.  Hypoxic respiratory failure, likely secondary to pneumonia, questionable 

component of chronic obstructive 


pulmonary disease given the diffuse wheezing.  We will treat as the patient has 

chronic obstructive pulmonary 


disease exacerbation given history of chronic smoking with 42-pack-year 

history.  


- Albuterol/ipratropium - 1 neb INH RT Q4 hrs PRN





3.  Has atrial fibrillation and [id number] risk of 2


   - Now on amiodarone 200mg PO daily for rate control


   -enoxaparin 60mg subcut





4.  Hepatic steatosis likely secondary to nonalcoholic liver disease.





5.  Right adrenal nodule.  We will consider followup with an adrenal CT on 

discharge.





6.  History of diabetes.


   -Lispro sliding scale


   -Glipizide 5mg oral daily





7.  History of hypertension.  








8.  History of dyslipidemia.  


   atorvastatin-10mg po daily


9. Osteoporosis


   -alendronate 70 mg po weekly


10. DVT prophylaxis


   -Enoxaparin 60mg subcut


11. Smoking cessation


   -pt agreed to nicotine patch 21mg(1 patch


12.)Tachycardia


   -Metoprolol 5mg IV Q6H PRN(0445)


   -Amiodarone 200mg PO daily

## 2019-09-27 NOTE — PN
Progress Note





- Progress Note


Date of Service: 09/27/19


SOAP: 


Subjective:


CC: Pneumonia





HPI:


Ms. Cortes is a 63 yo female with PMH significant for DM2, HTN, HLD, and 

osteoporosis; who presented to the hospital for fever and was admitted for 

pneumonia. Denies chills, nausea, vomiting, or diarrhea. Reports continued 

fevers, cough that is non productive. She states that her shortness of breath 

is improving. She states that she is starting to feel better.





Objective:


 Vital Signs - 8 hr











  09/27/19 09/27/19 09/27/19





  07:00 08:00 09:00


 


Temperature  97.4 F 


 


Pulse Rate 92 91 93


 


Respiratory 23 21 25





Rate   


 


Blood Pressure 140/77 143/77 140/90





(mmHg)   


 


O2 Sat by Pulse 100 99 95





Oximetry   





Physical Exam:


General: NAD, sitting up in bed


Neurological: Alert and Oriented 


HEENT: Moist MM, no thrush


Cardiovascular: Heart rate irregular


Respiratory: Lung sound diminished


Abdominal: Bowel sounds present; ABD soft, non tender and non distended


Skin: No rash





 Laboratory Results - last 24 hr











  09/26/19 09/27/19 09/27/19





  20:55 04:18 04:18


 


WBC    8.4


 


RBC    3.88


 


Hgb    10.6 L


 


Hct    31 L


 


MCV    80


 


MCH    28


 


MCHC    34


 


RDW    14


 


Plt Count    242


 


MPV    8.0


 


Neut % (Auto)    89.4


 


Lymph % (Auto)    5.9


 


Mono % (Auto)    4.4


 


Eos % (Auto)    0.2


 


Baso % (Auto)    0.1


 


Absolute Neuts (auto)    7.5


 


Absolute Lymphs (auto)    0.5 L


 


Absolute Monos (auto)    0.4


 


Absolute Eos (auto)    0.0


 


Absolute Basos (auto)    0.0


 


Absolute Nucleated RBC    0.0


 


Nucleated RBC %    0.0


 


BUN   9 


 


Creatinine   0.38 L 


 


Est GFR ( Amer)   207.6 


 


Est GFR (Non-Af Amer)   171.6 


 


POC Glucose (mg/dL)  176 H  


 


C-Reactive Protein   259.22 H 


 


Vancomycin Trough   7.8 








 Microbiology











 09/21/19 22:05 Aerobic Blood Culture - Final





 Blood Venous    No Growth Day 5





 Anaerobic Blood Culture - Final





    No Growth Day 5


 


 09/21/19 22:05 Aerobic Blood Culture - Final





 Blood Venous    No Growth Day 5





 Anaerobic Blood Culture - Final





    No Growth Day 5


 


 09/24/19 15:58 Gram Stain - Final





 No Source Provided Sputum Culture - Final





    MRSA





    Normal Maury


 


 09/24/19 10:08 Nasal Screen MRSA (PCR) - Final





 Nasal    Mrsa Detected


 


 09/21/19 23:18 Urine Culture - Final





 Urine    No Growth (<1,000 CFU/mL)


 


 09/22/19 12:15 Legionella Urinary Antigen - Final





 Urine    Positive Legionella Antigen





 Streptococcus pneumoniae Ag Screen - Final





    Negative S. pneumo Antigen








Assessment:


1. Community Acquired PNA, Legionella. Blood cultures with no growth to date. 

Urine antigen for Legionella positive. Sputum culture with normal maury and 

MRSA. She was not improving on legionella treatment alone, and ABX were 

broadened. Continues to have fevers in the evening, last 102 yesterday 

afternoon. Initial leukocytosis resolved. Continues to have tachycardia. 


2.  Acute hypoxic respiratory failure. Improving, vapotherm is being weaned. 


3.  DM2.





Plan:


Continue Vancomycin and Azithromycin. Recommend obtaining a chest CT to 

evaluate for lung abscess or pleural effusion. Should have aspiration of 

pleural effusion if it is large. Further recommendations will be based on 

clinical course and CT results.

## 2019-09-28 LAB
ALBUMIN SERPL BCG-MCNC: 2 G/DL (ref 3.2–5.2)
BASOPHILS # BLD AUTO: 0 10^3/UL (ref 0–0.2)
EOSINOPHIL # BLD AUTO: 0.1 10^3/UL (ref 0–0.6)
HCT VFR BLD AUTO: 30 % (ref 35–47)
HGB BLD-MCNC: 10.4 G/DL (ref 12–16)
LYMPHOCYTES # BLD AUTO: 0.5 10^3/UL (ref 1–4.8)
MCH RBC QN AUTO: 28 PG (ref 27–31)
MCHC RBC AUTO-ENTMCNC: 34 G/DL (ref 31–36)
MCV RBC AUTO: 80 FL (ref 80–97)
MONOCYTES # BLD AUTO: 0.5 10^3/UL (ref 0–0.8)
NEUTROPHILS # BLD AUTO: 6.3 10^3/UL (ref 1.5–7.7)
NRBC # BLD AUTO: 0 10^3/UL
NRBC BLD QL AUTO: 0
PLATELET # BLD AUTO: 241 10^3/UL (ref 150–450)
PROT SERPL-MCNC: 4.3 G/DL (ref 6.4–8.9)
RBC # BLD AUTO: 3.76 10^6 /UL (ref 3.7–4.87)
WBC # BLD AUTO: 7.3 10^3/UL (ref 3.5–10.8)

## 2019-09-28 RX ADMIN — FUROSEMIDE SCH MG: 20 TABLET ORAL at 18:54

## 2019-09-28 RX ADMIN — INSULIN LISPRO SCH UNIT: 100 INJECTION, SOLUTION INTRAVENOUS; SUBCUTANEOUS at 13:10

## 2019-09-28 RX ADMIN — VANCOMYCIN HYDROCHLORIDE SCH MLS/HR: 1 INJECTION, POWDER, LYOPHILIZED, FOR SOLUTION INTRAVENOUS at 23:58

## 2019-09-28 RX ADMIN — Medication SCH: at 09:26

## 2019-09-28 RX ADMIN — IPRATROPIUM BROMIDE AND ALBUTEROL SULFATE PRN NEB: .5; 3 SOLUTION RESPIRATORY (INHALATION) at 03:30

## 2019-09-28 RX ADMIN — ENOXAPARIN SODIUM SCH MG: 60 INJECTION SUBCUTANEOUS at 06:12

## 2019-09-28 RX ADMIN — GABAPENTIN SCH MG: 100 CAPSULE ORAL at 09:25

## 2019-09-28 RX ADMIN — METOPROLOL TARTRATE SCH: 5 INJECTION, SOLUTION INTRAVENOUS at 03:07

## 2019-09-28 RX ADMIN — AMIODARONE HYDROCHLORIDE SCH MG: 200 TABLET ORAL at 09:25

## 2019-09-28 RX ADMIN — Medication SCH: at 01:58

## 2019-09-28 RX ADMIN — INSULIN LISPRO SCH UNIT: 100 INJECTION, SOLUTION INTRAVENOUS; SUBCUTANEOUS at 18:00

## 2019-09-28 RX ADMIN — VANCOMYCIN HYDROCHLORIDE SCH MLS/HR: 1 INJECTION, POWDER, LYOPHILIZED, FOR SOLUTION INTRAVENOUS at 15:09

## 2019-09-28 RX ADMIN — ACYCLOVIR SCH MG: 400 TABLET ORAL at 21:21

## 2019-09-28 RX ADMIN — VANCOMYCIN HYDROCHLORIDE SCH MLS/HR: 1 INJECTION, POWDER, LYOPHILIZED, FOR SOLUTION INTRAVENOUS at 06:12

## 2019-09-28 RX ADMIN — INSULIN LISPRO SCH UNIT: 100 INJECTION, SOLUTION INTRAVENOUS; SUBCUTANEOUS at 21:21

## 2019-09-28 RX ADMIN — ACYCLOVIR SCH MG: 400 TABLET ORAL at 12:20

## 2019-09-28 RX ADMIN — GABAPENTIN SCH MG: 100 CAPSULE ORAL at 20:35

## 2019-09-28 RX ADMIN — Medication SCH: at 18:00

## 2019-09-28 RX ADMIN — ATORVASTATIN CALCIUM SCH MG: 10 TABLET, FILM COATED ORAL at 20:36

## 2019-09-28 RX ADMIN — ACYCLOVIR SCH MG: 400 TABLET ORAL at 15:09

## 2019-09-28 RX ADMIN — AZITHROMYCIN SCH MLS/HR: 500 INJECTION, POWDER, LYOPHILIZED, FOR SOLUTION INTRAVENOUS at 01:58

## 2019-09-28 RX ADMIN — ACYCLOVIR SCH MG: 400 TABLET ORAL at 18:00

## 2019-09-28 RX ADMIN — INSULIN LISPRO SCH UNIT: 100 INJECTION, SOLUTION INTRAVENOUS; SUBCUTANEOUS at 09:25

## 2019-09-28 RX ADMIN — METOPROLOL TARTRATE SCH: 5 INJECTION, SOLUTION INTRAVENOUS at 09:26

## 2019-09-28 RX ADMIN — ENOXAPARIN SODIUM SCH MG: 60 INJECTION SUBCUTANEOUS at 18:00

## 2019-09-28 NOTE — PN
Subjective


Date of Service: 09/28/19


Interval History: 





Patient complained of severe burning sensation from right flank along the lower 

rib. No rashes or vesicles seen. 


Complained generalized distension. 





Afebrile for 24 hours.





Objective


Active Medications: 








Acetaminophen (Tylenol Tab*)  650 mg PO Q6H PRN


   PRN Reason: PAIN - MILD


Acyclovir (Zovirax Tab*)  800 mg PO FIVE TIMES DAILY [facility]


   Stop: 10/05/19 09:59


   Last Admin: 09/28/19 12:20 Dose:  800 mg


Albuterol/Ipratropium (Duoneb (Albuterol 2.5 Mg/Ipratropium 0.5 Mg))  1 neb INH 

[id number] AWAKE PRN


   PRN Reason: SOB/WHEEZING


   Last Admin: 09/28/19 03:30 Dose:  1 neb


Amiodarone HCl (Cordarone Tab*)  200 mg PO DAILY [facility]


   Last Admin: 09/28/19 09:25 Dose:  200 mg


Atorvastatin Calcium (Lipitor*)  10 mg PO BEDTIME [facility]


   Last Admin: 09/27/19 20:38 Dose:  10 mg


Dextrose (Dextrose 50% Vial 50 Ml*)  25 ml IV PUSH .FOR FS < 60 - SS PRN


   PRN Reason: FS < 60


Enoxaparin Sodium (Lovenox(*))  60 mg SUBCUT 0600,1800 [facility]


   Last Admin: 09/28/19 06:12 Dose:  60 mg


Gabapentin (Neurontin Cap(*))  100 mg PO BID [facility]


   Last Admin: 09/28/19 09:25 Dose:  100 mg


Azithromycin (Zithromax 500 Mg/250 Ml)  500 mg in 250 mls @ 250 mls/hr IVPB 

Q24H [facility]


   Last Admin: 09/28/19 01:58 Dose:  250 mls/hr


Vancomycin HCl 1,250 mg/ (Sodium Chloride)  250 mls @ 166.667 mls/hr IVPB Q8H 

[facility]; Protocol


   Last Admin: 09/28/19 06:12 Dose:  166.667 mls/hr


Insulin Human Lispro (Humalog*)  0 units SUBCUT ACHS [facility]; Protocol


   Last Admin: 09/28/19 13:10 Dose:  6 unit


Metoprolol Tartrate (Lopressor Tab*)  12.5 mg PO Q12HR [facility]


Vicks Vapo Rub  1 dose TOPICAL Q8H [facility]


   Last Admin: 09/28/19 09:26 Dose:  Not Given


Pharmacy Consult (Vancomycin Per Pharmacy*)  1 note FOLLOW UP .VANC PER 

PHARMACY [facility]; Protocol


Pharmacy Profile Note (Vancomycin Trough Check)  1 note FOLLOW UP 0530 ONE


   Stop: 09/29/19 05:31








 Vital Signs - 8 hr











  09/28/19 09/28/19 09/28/19





  07:24 09:25 12:22


 


Temperature 97.5 F  


 


Pulse Rate 93  


 


Respiratory 18 18 18





Rate   


 


Blood Pressure 140/69  





(mmHg)   


 


O2 Sat by Pulse 97  





Oximetry   











Oxygen Devices in Use Now: Nasal Cannula


Exam: 


Gen:in distress due to right rib pain





HEENT: normal





Lungs: right side decreased air entry up to middle area; swollen on palpation





Cardiac:  irregular rhythm , HR in 100s





Abdomen: distended, 





Skin: no rashes seen 





Extremities: No cyanosis. pitting edema on leg, sacral edema





Result Diagrams: 


 09/28/19 07:19





 09/27/19 04:18


Additional Lab and Data: 


 Lab Results











  09/21/19 09/21/19 09/21/19 Range/Units





  22:05 22:05 22:05 


 


WBC  12.2 H    (3.5-10.8)  10^3/uL


 


RBC  5.11 H    (3.70-4.87)  10^6 /uL


 


Hgb  14.0    (12.0-16.0)  g/dL


 


Hct  42    (35-47)  %


 


MCV  82    (80-97)  fL


 


MCH  27    (27-31)  pg


 


MCHC  33    (31-36)  g/dL


 


RDW  14    (10-15)  %


 


Plt Count  164    (150-450)  10^3/uL


 


MPV  8.7    (7.4-10.4)  fL


 


Neut % (Auto)  91.2    %


 


Lymph % (Auto)  4.2    %


 


Mono % (Auto)  4.2    %


 


Eos % (Auto)  0.0    %


 


Baso % (Auto)  0.4    %


 


Absolute Neuts (auto)  11.1 H    (1.5-7.7)  10^3/ul


 


Absolute Lymphs (auto)  0.5 L    (1.0-4.8)  10^3/ul


 


Absolute Monos (auto)  0.5    (0-0.8)  10^3/ul


 


Absolute Eos (auto)  0.0    (0-0.6)  10^3/ul


 


Absolute Basos (auto)  0.1    (0-0.2)  10^3/ul


 


Absolute Nucleated RBC  0.0    10^3/ul


 


Nucleated RBC %  0.0    


 


INR (Anticoag Therapy)   1.26 H   (0.82-1.09)  


 


Sodium    125 L  (135-145)  mmol/L


 


Potassium    3.8  (3.5-5.0)  mmol/L


 


Chloride    95 L  (101-111)  mmol/L


 


Carbon Dioxide    21 L  (22-32)  mmol/L


 


Anion Gap    9  (2-11)  mmol/L


 


BUN    15  (6-24)  mg/dL


 


Creatinine    0.80  (0.51-0.95)  mg/dL


 


Est GFR ( Amer)    87.9  (>60)  


 


Est GFR (Non-Af Amer)    72.7  (>60)  


 


BUN/Creatinine Ratio    18.8  (8-20)  


 


Glucose    287 H  ()  mg/dL


 


Lactic Acid     (0.5-2.0)  mmol/L


 


Calcium    9.1  (8.6-10.3)  mg/dL


 


Total Bilirubin    0.40  (0.2-1.0)  mg/dL


 


AST    26  (13-39)  U/L


 


ALT    21  (7-52)  U/L


 


Alkaline Phosphatase    63  ()  U/L


 


Troponin I    0.10 H*  (<0.04)  ng/mL


 


C-Reactive Protein    399.05 H  (<8.01)  mg/L


 


Total Protein    7.1  (6.4-8.9)  g/dL


 


Albumin    3.6  (3.2-5.2)  g/dL


 


Globulin    3.5  (2-4)  g/dL


 


Albumin/Globulin Ratio    1.0  (1-3)  














  09/21/19 Range/Units





  22:05 


 


WBC   (3.5-10.8)  10^3/uL


 


RBC   (3.70-4.87)  10^6 /uL


 


Hgb   (12.0-16.0)  g/dL


 


Hct   (35-47)  %


 


MCV   (80-97)  fL


 


MCH   (27-31)  pg


 


MCHC   (31-36)  g/dL


 


RDW   (10-15)  %


 


Plt Count   (150-450)  10^3/uL


 


MPV   (7.4-10.4)  fL


 


Neut % (Auto)   %


 


Lymph % (Auto)   %


 


Mono % (Auto)   %


 


Eos % (Auto)   %


 


Baso % (Auto)   %


 


Absolute Neuts (auto)   (1.5-7.7)  10^3/ul


 


Absolute Lymphs (auto)   (1.0-4.8)  10^3/ul


 


Absolute Monos (auto)   (0-0.8)  10^3/ul


 


Absolute Eos (auto)   (0-0.6)  10^3/ul


 


Absolute Basos (auto)   (0-0.2)  10^3/ul


 


Absolute Nucleated RBC   10^3/ul


 


Nucleated RBC %   


 


INR (Anticoag Therapy)   (0.82-1.09)  


 


Sodium   (135-145)  mmol/L


 


Potassium   (3.5-5.0)  mmol/L


 


Chloride   (101-111)  mmol/L


 


Carbon Dioxide   (22-32)  mmol/L


 


Anion Gap   (2-11)  mmol/L


 


BUN   (6-24)  mg/dL


 


Creatinine   (0.51-0.95)  mg/dL


 


Est GFR ( Amer)   (>60)  


 


Est GFR (Non-Af Amer)   (>60)  


 


BUN/Creatinine Ratio   (8-20)  


 


Glucose   ()  mg/dL


 


Lactic Acid  1.9  (0.5-2.0)  mmol/L


 


Calcium   (8.6-10.3)  mg/dL


 


Total Bilirubin   (0.2-1.0)  mg/dL


 


AST   (13-39)  U/L


 


ALT   (7-52)  U/L


 


Alkaline Phosphatase   ()  U/L


 


Troponin I   (<0.04)  ng/mL


 


C-Reactive Protein   (<8.01)  mg/L


 


Total Protein   (6.4-8.9)  g/dL


 


Albumin   (3.2-5.2)  g/dL


 


Globulin   (2-4)  g/dL


 


Albumin/Globulin Ratio   (1-3)  











Microbiology and Other Data: 


 Microbiology











 09/24/19 10:08 Nasal Screen MRSA (PCR) - Final





 Nasal    Mrsa Detected


 


 09/21/19 22:05 Aerobic Blood Culture - Preliminary





 Blood Venous    No Growth Day 2





 Anaerobic Blood Culture - Preliminary





    No Growth Day 2


 


 09/21/19 22:05 Aerobic Blood Culture - Preliminary





 Blood Venous    No Growth Day 2





 Anaerobic Blood Culture - Preliminary





    No Growth Day 2


 


 09/21/19 23:18 Urine Culture - Final





 Urine    No Growth (<1,000 CFU/mL)


 


 09/22/19 12:15 Legionella Urinary Antigen - Final





 Urine    Positive Legionella Antigen





 Streptococcus pneumoniae Ag Screen - Final





    Negative S. pneumo Antigen














Assess/Plan/Problems-Billing


64 year old woman with history of nephrolithiasis, HTN, and tobacco use 

admitted on 9/22 with back pain and found to have RLL pneumonia on the CT abd/

pelvis and urine legionella antigen positive, in ICU from 9/24-9/27 for hypoxic 

respiratory failure requiring Vapotherm,  s/p thoracocentesis yesterday with 

only scant fluid obtained. Her course complicated with possible early shingles 

and anasarca due to malnutrition. 


 











- Patient Problems


(1) Acute hypoxemic respiratory failure


Current Visit: Yes   Status: Acute   Code(s): J96.01 - ACUTE RESPIRATORY 

FAILURE WITH HYPOXIA   SNOMED Code(s): 606410567


   Comment: Legionella pneumonia with right side pleural effusion


D5 Azithromycin, D3 Vancomycin for MRSA found in sputum cs


requiring ICU stay 9/23-27 for high flow nasal cannula


thoracocentesis 9/27/2019 only scant fluid obtained,  normal WBC, LDH, protein 

pending


continue abx for now, wean down O2   





(2) Atrial fibrillation


Current Visit: Yes   Status: Acute   Code(s): I48.91 - UNSPECIFIED ATRIAL 

FIBRILLATION   SNOMED Code(s): 08687382


   Comment: AFib with RVR, BP okay 


TTE no valvular changes


oralize to metoprolol 12.5mg Q12h


continue therapeutic dose of Lovenox for her with a [id number] of 2, continue 

to switch to Xarelto once pt further improves


   





(3) Shingles


Current Visit: Yes   Status: Acute   Code(s): B02.9 - ZOSTER WITHOUT 

COMPLICATIONS   SNOMED Code(s): 0115343


   Comment: new onset of right burning sensation in dermatone distribution, 

concerning for shingles although no vesicles seen now.


Start acyclovir 7 days.    





(4) COPD exacerbation


Current Visit: Yes   Status: Acute   Code(s): J44.1 - CHRONIC OBSTRUCTIVE 

PULMONARY DISEASE W (ACUTE) EXACERBATION   SNOMED Code(s): 737213261


   Comment: complicated with acute COPD exacerbation with ongoing pneumonia


completed 3 days of pred burst   





(5) Nicotine dependence


Current Visit: Yes   Status: Acute   Code(s): F17.200 - NICOTINE DEPENDENCE, 

UNSPECIFIED, UNCOMPLICATED   SNOMED Code(s): 75944558


   Comment: start nicotine patch   





(6) DVT prophylaxis


Current Visit: Yes   Status: Acute   Code(s): Z29.9 - ENCOUNTER FOR 

PROPHYLACTIC MEASURES, UNSPECIFIED   SNOMED Code(s): 028251276


   Comment: therapeutic lovenox   





(7) Anasarca


Current Visit: Yes   Status: Acute   Code(s): R60.1 - GENERALIZED EDEMA   

SNOMED Code(s): 771200408


   Comment: probably due to malnutrition caused low albumin, last albumin 23


nutrition review, start glucerna


   


Status and Disposition: 





Inpatient Medicine. 





Attestation


Documenting Resident: Peyton Jane


Supervising Physician: Fabienne Lemos


Attending/Supervising Physician Comment: 





Improving legionella pneumonia with decreasing O2 requirements. 


No LDH/protein sent from yesterday's pleural fluid; will attempt to add on 

today. 


Awaiting culture, though gram stain unremarkable. 


Now with a burning sensation over dermatomal pattern on back.  Suspect zoster 

without appearance of vesicles yet; will treat empirically. 


Attestation: 


This service has been performed in part by a resident under the direction of a 

teaching physician.I, Fabienne Lemos, performed the service, or was physically 

present during the critical, or key portions of the service, furnished by the 

resident. I participated in the management of the patient.

## 2019-09-29 LAB
ALBUMIN SERPL BCG-MCNC: 2 G/DL (ref 3.2–5.2)
ALBUMIN/GLOB SERPL: 0.8 {RATIO} (ref 1–3)
ALP SERPL-CCNC: 73 U/L (ref 34–104)
ALT SERPL W P-5'-P-CCNC: 32 U/L (ref 7–52)
ANION GAP SERPL CALC-SCNC: 6 MMOL/L (ref 2–11)
ANION GAP SERPL CALC-SCNC: 7 MMOL/L (ref 2–11)
AST SERPL-CCNC: 54 U/L (ref 13–39)
BUN SERPL-MCNC: 6 MG/DL (ref 6–24)
BUN SERPL-MCNC: 8 MG/DL (ref 6–24)
BUN/CREAT SERPL: 10.7 (ref 8–20)
BUN/CREAT SERPL: 10.8 (ref 8–20)
CALCIUM SERPL-MCNC: 7.3 MG/DL (ref 8.6–10.3)
CALCIUM SERPL-MCNC: 7.6 MG/DL (ref 8.6–10.3)
CHLORIDE SERPL-SCNC: 100 MMOL/L (ref 101–111)
CHLORIDE SERPL-SCNC: 100 MMOL/L (ref 101–111)
GLOBULIN SER CALC-MCNC: 2.5 G/DL (ref 2–4)
GLUCOSE SERPL-MCNC: 121 MG/DL (ref 70–100)
GLUCOSE SERPL-MCNC: 253 MG/DL (ref 70–100)
HCO3 SERPL-SCNC: 34 MMOL/L (ref 22–32)
HCO3 SERPL-SCNC: 35 MMOL/L (ref 22–32)
MAGNESIUM SERPL-MCNC: 1.8 MG/DL (ref 1.9–2.7)
MAGNESIUM SERPL-MCNC: 2.3 MG/DL (ref 1.9–2.7)
POTASSIUM SERPL-SCNC: 2.3 MMOL/L (ref 3.5–5)
POTASSIUM SERPL-SCNC: 2.8 MMOL/L (ref 3.5–5)
PREALB SERPL-MCNC: 3 MG/DL (ref 18–38)
PROT SERPL-MCNC: 4.5 G/DL (ref 6.4–8.9)
SODIUM SERPL-SCNC: 140 MMOL/L (ref 135–145)
SODIUM SERPL-SCNC: 142 MMOL/L (ref 135–145)

## 2019-09-29 RX ADMIN — Medication SCH: at 17:51

## 2019-09-29 RX ADMIN — ATORVASTATIN CALCIUM SCH MG: 10 TABLET, FILM COATED ORAL at 21:05

## 2019-09-29 RX ADMIN — VANCOMYCIN HYDROCHLORIDE SCH: 1 INJECTION, POWDER, LYOPHILIZED, FOR SOLUTION INTRAVENOUS at 06:57

## 2019-09-29 RX ADMIN — GABAPENTIN SCH MG: 100 CAPSULE ORAL at 09:53

## 2019-09-29 RX ADMIN — INSULIN LISPRO SCH UNIT: 100 INJECTION, SOLUTION INTRAVENOUS; SUBCUTANEOUS at 22:56

## 2019-09-29 RX ADMIN — ACYCLOVIR SCH MG: 400 TABLET ORAL at 12:35

## 2019-09-29 RX ADMIN — VANCOMYCIN HYDROCHLORIDE SCH MLS/HR: 1 INJECTION, POWDER, LYOPHILIZED, FOR SOLUTION INTRAVENOUS at 13:19

## 2019-09-29 RX ADMIN — ACYCLOVIR SCH MG: 400 TABLET ORAL at 21:05

## 2019-09-29 RX ADMIN — GABAPENTIN SCH MG: 100 CAPSULE ORAL at 21:04

## 2019-09-29 RX ADMIN — INSULIN LISPRO SCH UNIT: 100 INJECTION, SOLUTION INTRAVENOUS; SUBCUTANEOUS at 09:47

## 2019-09-29 RX ADMIN — POTASSIUM CHLORIDE SCH MLS/HR: 200 INJECTION, SOLUTION INTRAVENOUS at 13:19

## 2019-09-29 RX ADMIN — METOPROLOL SUCCINATE SCH MG: 50 TABLET, EXTENDED RELEASE ORAL at 09:54

## 2019-09-29 RX ADMIN — VANCOMYCIN HYDROCHLORIDE SCH MLS/HR: 1 INJECTION, POWDER, LYOPHILIZED, FOR SOLUTION INTRAVENOUS at 20:30

## 2019-09-29 RX ADMIN — IPRATROPIUM BROMIDE AND ALBUTEROL SULFATE PRN NEB: .5; 3 SOLUTION RESPIRATORY (INHALATION) at 08:55

## 2019-09-29 RX ADMIN — Medication SCH: at 03:01

## 2019-09-29 RX ADMIN — FUROSEMIDE SCH MG: 20 TABLET ORAL at 09:54

## 2019-09-29 RX ADMIN — AZITHROMYCIN SCH MLS/HR: 500 INJECTION, POWDER, LYOPHILIZED, FOR SOLUTION INTRAVENOUS at 02:58

## 2019-09-29 RX ADMIN — POTASSIUM CHLORIDE SCH MLS/HR: 200 INJECTION, SOLUTION INTRAVENOUS at 09:47

## 2019-09-29 RX ADMIN — INSULIN LISPRO SCH UNIT: 100 INJECTION, SOLUTION INTRAVENOUS; SUBCUTANEOUS at 12:41

## 2019-09-29 RX ADMIN — ACYCLOVIR SCH MG: 400 TABLET ORAL at 17:48

## 2019-09-29 RX ADMIN — INSULIN LISPRO SCH UNIT: 100 INJECTION, SOLUTION INTRAVENOUS; SUBCUTANEOUS at 17:47

## 2019-09-29 RX ADMIN — IPRATROPIUM BROMIDE AND ALBUTEROL SULFATE PRN NEB: .5; 3 SOLUTION RESPIRATORY (INHALATION) at 22:56

## 2019-09-29 RX ADMIN — ACYCLOVIR SCH MG: 400 TABLET ORAL at 15:45

## 2019-09-29 RX ADMIN — ACYCLOVIR SCH MG: 400 TABLET ORAL at 05:28

## 2019-09-29 RX ADMIN — AMIODARONE HYDROCHLORIDE SCH MG: 200 TABLET ORAL at 09:54

## 2019-09-29 RX ADMIN — Medication SCH: at 12:34

## 2019-09-29 RX ADMIN — ENOXAPARIN SODIUM SCH MG: 60 INJECTION SUBCUTANEOUS at 05:28

## 2019-09-29 RX ADMIN — ENOXAPARIN SODIUM SCH MG: 60 INJECTION SUBCUTANEOUS at 17:48

## 2019-09-29 RX ADMIN — POTASSIUM CHLORIDE SCH MLS/HR: 200 INJECTION, SOLUTION INTRAVENOUS at 15:43

## 2019-09-29 NOTE — PN
Subjective


Date of Service: 09/29/19


Interval History: 





Narda feels good this morning.  Ate entire breakfast.  Pain on left flank is 

the same as yesterday--described as burning skin pain.  No cough, no shortness 

of breath.  Hector tried to wean o2 yesterday but Narda didn't want her to 

wean it past 3L.  Walks to bathroom wtih no difficulty.





Objective


Active Medications: 








Acetaminophen (Tylenol Tab*)  650 mg PO Q6H PRN


   PRN Reason: PAIN - MILD


Acyclovir (Zovirax Tab*)  800 mg PO FIVE TIMES DAILY [facility]


   Stop: 10/05/19 09:59


   Last Admin: 09/29/19 05:28 Dose:  800 mg


Albuterol/Ipratropium (Duoneb (Albuterol 2.5 Mg/Ipratropium 0.5 Mg))  1 neb INH 

[id number] AWAKE PRN


   PRN Reason: SOB/WHEEZING


   Last Admin: 09/28/19 03:30 Dose:  1 neb


Amiodarone HCl (Cordarone Tab*)  200 mg PO DAILY [facility]


   Last Admin: 09/28/19 09:25 Dose:  200 mg


Atorvastatin Calcium (Lipitor*)  10 mg PO BEDTIME [facility]


   Last Admin: 09/28/19 20:36 Dose:  10 mg


Dextrose (Dextrose 50% Vial 50 Ml*)  25 ml IV PUSH .FOR FS < 60 - SS PRN


   PRN Reason: FS < 60


Enoxaparin Sodium (Lovenox(*))  60 mg SUBCUT 0600,1800 [facility]


   Last Admin: 09/29/19 05:28 Dose:  60 mg


Furosemide (Lasix Tab*)  20 mg PO DAILY [facility]


   Last Admin: 09/28/19 18:54 Dose:  20 mg


Gabapentin (Neurontin Cap(*))  100 mg PO BID [facility]


   Last Admin: 09/28/19 20:35 Dose:  100 mg


Azithromycin (Zithromax 500 Mg/250 Ml)  500 mg in 250 mls @ 250 mls/hr IVPB 

Q24H [facility]


   Last Admin: 09/29/19 02:58 Dose:  250 mls/hr


Potassium Chloride (Potassium Chloride 20 Meq/100 Ml Ivpremix*)  20 meq in 100 

mls @ 50 mls/hr IV Q2H [facility]


   Stop: 09/29/19 13:59


Vancomycin HCl 1,000 mg/ (Sodium Chloride)  250 mls @ 166.667 mls/hr IVPB Q8H 

[facility]


Magnesium Sulfate (Magnesium Sulfate 2 Gm Iv*)  2 gm in 50 mls @ 50 mls/hr IVPB 

ONCE ONE


   Stop: 09/29/19 09:07


Potassium Chloride (Potassium Chloride 20 Meq/100 Ml Ivpremix*)  20 meq in 100 

mls @ 50 mls/hr IV Q2H [facility]


   Stop: 09/29/19 14:59


Insulin Human Lispro (Humalog*)  0 units SUBCUT ACHS [facility]; Protocol


   Last Admin: 09/28/19 21:21 Dose:  3 unit


Metoprolol Tartrate (Lopressor Tab*)  25 mg PO Q12HR [facility]


   Last Admin: 09/28/19 20:35 Dose:  25 mg


Vicks Vapo Rub  1 dose TOPICAL Q8H [facility]


   Last Admin: 09/29/19 03:01 Dose:  Not Given


Pharmacy Consult (Vancomycin Per Pharmacy*)  1 note FOLLOW UP .VANC PER 

PHARMACY [facility]; Protocol


Pharmacy Profile Note (Vancomycin Trough Check)  1 note FOLLOW UP ONCE ONE


   Stop: 10/01/19 11:31


Potassium Chloride (Klor Con Er Tab*)  20 meq PO Q2H [facility]


   Stop: 09/29/19 10:01


Potassium Chloride (Klor Con Er Tab*)  20 meq PO TID [facility]








 Vital Signs - 8 hr











  09/29/19





  03:30


 


Temperature 97.6 F


 


Pulse Rate 90


 


Respiratory 18





Rate 


 


Blood Pressure 142/67





(mmHg) 


 


O2 Sat by Pulse 95





Oximetry 











Oxygen Devices in Use Now: Nasal Cannula


Appearance: alert, well appearing, no accessory resp muscle use


Eyes: No Scleral Icterus


Ears/Nose/Mouth/Throat: NL Teeth, Lips, Gums


Neck: - - no appreciable JVP 


Respiratory: Symmetrical Chest Expansion and Respiratory Effort, Clear to 

Auscultation


Cardiovascular: - - irregular rhythm, no murmurs


Abdominal: NL Sounds; No Tenderness; No Distention, - - ++ presacral edema


Lymphatic: No Cervical Adenopathy


Extremities: - - 2+ edema


Skin: - - hypersensitivity to light touch left flank, no vesicles 


Result Diagrams: 


 09/28/19 07:19





 09/29/19 06:06


Additional Lab and Data: 


 Lab Results











  09/21/19 09/21/19 09/21/19 Range/Units





  22:05 22:05 22:05 


 


WBC  12.2 H    (3.5-10.8)  10^3/uL


 


RBC  5.11 H    (3.70-4.87)  10^6 /uL


 


Hgb  14.0    (12.0-16.0)  g/dL


 


Hct  42    (35-47)  %


 


MCV  82    (80-97)  fL


 


MCH  27    (27-31)  pg


 


MCHC  33    (31-36)  g/dL


 


RDW  14    (10-15)  %


 


Plt Count  164    (150-450)  10^3/uL


 


MPV  8.7    (7.4-10.4)  fL


 


Neut % (Auto)  91.2    %


 


Lymph % (Auto)  4.2    %


 


Mono % (Auto)  4.2    %


 


Eos % (Auto)  0.0    %


 


Baso % (Auto)  0.4    %


 


Absolute Neuts (auto)  11.1 H    (1.5-7.7)  10^3/ul


 


Absolute Lymphs (auto)  0.5 L    (1.0-4.8)  10^3/ul


 


Absolute Monos (auto)  0.5    (0-0.8)  10^3/ul


 


Absolute Eos (auto)  0.0    (0-0.6)  10^3/ul


 


Absolute Basos (auto)  0.1    (0-0.2)  10^3/ul


 


Absolute Nucleated RBC  0.0    10^3/ul


 


Nucleated RBC %  0.0    


 


INR (Anticoag Therapy)   1.26 H   (0.82-1.09)  


 


Sodium    125 L  (135-145)  mmol/L


 


Potassium    3.8  (3.5-5.0)  mmol/L


 


Chloride    95 L  (101-111)  mmol/L


 


Carbon Dioxide    21 L  (22-32)  mmol/L


 


Anion Gap    9  (2-11)  mmol/L


 


BUN    15  (6-24)  mg/dL


 


Creatinine    0.80  (0.51-0.95)  mg/dL


 


Est GFR ( Amer)    87.9  (>60)  


 


Est GFR (Non-Af Amer)    72.7  (>60)  


 


BUN/Creatinine Ratio    18.8  (8-20)  


 


Glucose    287 H  ()  mg/dL


 


Lactic Acid     (0.5-2.0)  mmol/L


 


Calcium    9.1  (8.6-10.3)  mg/dL


 


Total Bilirubin    0.40  (0.2-1.0)  mg/dL


 


AST    26  (13-39)  U/L


 


ALT    21  (7-52)  U/L


 


Alkaline Phosphatase    63  ()  U/L


 


Troponin I    0.10 H*  (<0.04)  ng/mL


 


C-Reactive Protein    399.05 H  (<8.01)  mg/L


 


Total Protein    7.1  (6.4-8.9)  g/dL


 


Albumin    3.6  (3.2-5.2)  g/dL


 


Globulin    3.5  (2-4)  g/dL


 


Albumin/Globulin Ratio    1.0  (1-3)  














  09/21/19 Range/Units





  22:05 


 


WBC   (3.5-10.8)  10^3/uL


 


RBC   (3.70-4.87)  10^6 /uL


 


Hgb   (12.0-16.0)  g/dL


 


Hct   (35-47)  %


 


MCV   (80-97)  fL


 


MCH   (27-31)  pg


 


MCHC   (31-36)  g/dL


 


RDW   (10-15)  %


 


Plt Count   (150-450)  10^3/uL


 


MPV   (7.4-10.4)  fL


 


Neut % (Auto)   %


 


Lymph % (Auto)   %


 


Mono % (Auto)   %


 


Eos % (Auto)   %


 


Baso % (Auto)   %


 


Absolute Neuts (auto)   (1.5-7.7)  10^3/ul


 


Absolute Lymphs (auto)   (1.0-4.8)  10^3/ul


 


Absolute Monos (auto)   (0-0.8)  10^3/ul


 


Absolute Eos (auto)   (0-0.6)  10^3/ul


 


Absolute Basos (auto)   (0-0.2)  10^3/ul


 


Absolute Nucleated RBC   10^3/ul


 


Nucleated RBC %   


 


INR (Anticoag Therapy)   (0.82-1.09)  


 


Sodium   (135-145)  mmol/L


 


Potassium   (3.5-5.0)  mmol/L


 


Chloride   (101-111)  mmol/L


 


Carbon Dioxide   (22-32)  mmol/L


 


Anion Gap   (2-11)  mmol/L


 


BUN   (6-24)  mg/dL


 


Creatinine   (0.51-0.95)  mg/dL


 


Est GFR ( Amer)   (>60)  


 


Est GFR (Non-Af Amer)   (>60)  


 


BUN/Creatinine Ratio   (8-20)  


 


Glucose   ()  mg/dL


 


Lactic Acid  1.9  (0.5-2.0)  mmol/L


 


Calcium   (8.6-10.3)  mg/dL


 


Total Bilirubin   (0.2-1.0)  mg/dL


 


AST   (13-39)  U/L


 


ALT   (7-52)  U/L


 


Alkaline Phosphatase   ()  U/L


 


Troponin I   (<0.04)  ng/mL


 


C-Reactive Protein   (<8.01)  mg/L


 


Total Protein   (6.4-8.9)  g/dL


 


Albumin   (3.2-5.2)  g/dL


 


Globulin   (2-4)  g/dL


 


Albumin/Globulin Ratio   (1-3)  











Microbiology and Other Data: 


 Microbiology











 09/24/19 10:08 Nasal Screen MRSA (PCR) - Final





 Nasal    Mrsa Detected


 


 09/21/19 22:05 Aerobic Blood Culture - Preliminary





 Blood Venous    No Growth Day 2





 Anaerobic Blood Culture - Preliminary





    No Growth Day 2


 


 09/21/19 22:05 Aerobic Blood Culture - Preliminary





 Blood Venous    No Growth Day 2





 Anaerobic Blood Culture - Preliminary





    No Growth Day 2


 


 09/21/19 23:18 Urine Culture - Final





 Urine    No Growth (<1,000 CFU/mL)


 


 09/22/19 12:15 Legionella Urinary Antigen - Final





 Urine    Positive Legionella Antigen





 Streptococcus pneumoniae Ag Screen - Final





    Negative S. pneumo Antigen














Assess/Plan/Problems-Billing


64 year old woman with history of nephrolithiasis, HTN, and tobacco use 

admitted on 9/22 with back pain and found to have RLL pneumonia on the CT abd/

pelvis and urine legionella antigen positive, in ICU from 9/24-9/27 for hypoxic 

respiratory failure requiring Vapotherm,  s/p thoracocentesis yesterday with 

only scant fluid obtained. Her course complicated with possible early shingles 

and anasarca due to malnutrition. 


 











- Patient Problems


(1) Acute hypoxemic respiratory failure


Current Visit: Yes   Status: Acute   Code(s): J96.01 - ACUTE RESPIRATORY 

FAILURE WITH HYPOXIA   SNOMED Code(s): 654239680


   Comment: Legionella pneumonia with right side pleural effusion + MRSA in 

sputum


day 7 azithro; day 4 vanc


requiring ICU stay 9/23-27 for high flow nasal cannula


thoracocentesis 9/27/2019 only scant fluid obtained; LDH/total protein are 

pending


continue abx for now, wean down O2


may be some component of pulmonary edema related to hypoalbuminemia which is 

unlikely to respond to lasix, but low dose po lasix started yesterday; add 

daily weights   





(2) Legionella pneumonia


Current Visit: Yes   Status: Acute   Code(s): A48.1 - LEGIONNAIRES' DISEASE   

SNOMED Code(s): 120611853


   Comment: today is day 7 of azithromycin 


will treat for 10 days given the severity of her pneumonia    





(3) Atrial fibrillation


Current Visit: Yes   Status: Acute   Code(s): I48.91 - UNSPECIFIED ATRIAL 

FIBRILLATION   SNOMED Code(s): 72566294


   Comment: initially with RVR, now resolved


this is a new diagnosis for her


TTE no valvular changes


switch metoprolol to xl today; good rate control


on therapeutic lovenox for [id number] of 2 for now; can be switched to eliquis 

or xarelto prior to discharge


   





(4) Hypokalemia


Current Visit: Yes   Status: Acute   Code(s): E87.6 - HYPOKALEMIA   SNOMED Code(

s): 44169843


   Comment: maybe related to alkalosis?  intake is adequate, nothing to suggest 

RTA


replete aggressively today and recheck this afternoon   





(5) Anasarca


Current Visit: Yes   Status: Acute   Code(s): R60.1 - GENERALIZED EDEMA   

SNOMED Code(s): 174121056


   Comment: with marked hypoalbuminemia  


urine last week had 3+ protein... will quantify urine protein to eval for 

nephrotic syndrome; if positive will need more extensive work up 


   





(6) Shingles


Current Visit: Yes   Status: Acute   Code(s): B02.9 - ZOSTER WITHOUT 

COMPLICATIONS   SNOMED Code(s): 2197677


   Comment: new onset of right burning sensation in dermatone distribution, 

concerning for shingles although no vesicles seen now.


Start acyclovir 7 days.    





(7) COPD exacerbation


Current Visit: Yes   Status: Acute   Code(s): J44.1 - CHRONIC OBSTRUCTIVE 

PULMONARY DISEASE W (ACUTE) EXACERBATION   SNOMED Code(s): 898902470


   Comment: complicated with acute COPD exacerbation with ongoing pneumonia


completed 3 days of pred burst   





(8) Nicotine dependence


Current Visit: Yes   Status: Acute   Code(s): F17.200 - NICOTINE DEPENDENCE, 

UNSPECIFIED, UNCOMPLICATED   SNOMED Code(s): 16879748


   Comment: on nicotine patch with plans to abstain longterm   





(9) DVT prophylaxis


Current Visit: Yes   Status: Acute   Code(s): Z29.9 - ENCOUNTER FOR 

PROPHYLACTIC MEASURES, UNSPECIFIED   SNOMED Code(s): 426745422


   Comment: therapeutic lovenox   


Status and Disposition: 





Inpatient Medicine for acute hypoxic respiratory failure, hypokalemia, 

hypoalbuminemia.

## 2019-09-30 LAB
ANION GAP SERPL CALC-SCNC: 5 MMOL/L (ref 2–11)
BASOPHILS # BLD AUTO: 0 10^3/UL (ref 0–0.2)
BUN SERPL-MCNC: 6 MG/DL (ref 6–24)
BUN/CREAT SERPL: 8.2 (ref 8–20)
CALCIUM SERPL-MCNC: 7.5 MG/DL (ref 8.6–10.3)
CHLORIDE SERPL-SCNC: 104 MMOL/L (ref 101–111)
CREAT UR-MCNC: 26.75 MG/DL
EOSINOPHIL # BLD AUTO: 0.1 10^3/UL (ref 0–0.6)
GLUCOSE SERPL-MCNC: 158 MG/DL (ref 70–100)
HCO3 SERPL-SCNC: 34 MMOL/L (ref 22–32)
HCT VFR BLD AUTO: 29 % (ref 35–47)
HGB BLD-MCNC: 10.1 G/DL (ref 12–16)
LDH FLD L TO P-CCNC: 1230 U/L
LYMPHOCYTES # BLD AUTO: 0.6 10^3/UL (ref 1–4.8)
MAGNESIUM SERPL-MCNC: 2 MG/DL (ref 1.9–2.7)
MCH RBC QN AUTO: 28 PG (ref 27–31)
MCHC RBC AUTO-ENTMCNC: 35 G/DL (ref 31–36)
MCV RBC AUTO: 81 FL (ref 80–97)
MONOCYTES # BLD AUTO: 0.8 10^3/UL (ref 0–0.8)
NEUTROPHILS # BLD AUTO: 6.7 10^3/UL (ref 1.5–7.7)
NRBC # BLD AUTO: 0 10^3/UL
NRBC BLD QL AUTO: 0
PLATELET # BLD AUTO: 350 10^3/UL (ref 150–450)
POTASSIUM SERPL-SCNC: 3.1 MMOL/L (ref 3.5–5)
PROT FLD-MCNC: (no result) G/DL
PROT UR-MCNC: 18 MG/DL
RBC # BLD AUTO: 3.61 10^6 /UL (ref 3.7–4.87)
SODIUM SERPL-SCNC: 143 MMOL/L (ref 135–145)
WBC # BLD AUTO: 8.3 10^3/UL (ref 3.5–10.8)

## 2019-09-30 RX ADMIN — Medication SCH: at 02:14

## 2019-09-30 RX ADMIN — INSULIN LISPRO SCH UNIT: 100 INJECTION, SOLUTION INTRAVENOUS; SUBCUTANEOUS at 12:35

## 2019-09-30 RX ADMIN — IPRATROPIUM BROMIDE AND ALBUTEROL SULFATE PRN NEB: .5; 3 SOLUTION RESPIRATORY (INHALATION) at 06:33

## 2019-09-30 RX ADMIN — AMIODARONE HYDROCHLORIDE SCH MG: 200 TABLET ORAL at 08:21

## 2019-09-30 RX ADMIN — ACYCLOVIR SCH MG: 400 TABLET ORAL at 08:20

## 2019-09-30 RX ADMIN — GABAPENTIN SCH MG: 100 CAPSULE ORAL at 21:16

## 2019-09-30 RX ADMIN — POTASSIUM CHLORIDE SCH MLS/HR: 200 INJECTION, SOLUTION INTRAVENOUS at 04:30

## 2019-09-30 RX ADMIN — ACYCLOVIR SCH MG: 400 TABLET ORAL at 06:00

## 2019-09-30 RX ADMIN — POTASSIUM CHLORIDE SCH MLS/HR: 200 INJECTION, SOLUTION INTRAVENOUS at 08:18

## 2019-09-30 RX ADMIN — FUROSEMIDE SCH MG: 20 TABLET ORAL at 08:21

## 2019-09-30 RX ADMIN — ACYCLOVIR SCH MG: 400 TABLET ORAL at 18:12

## 2019-09-30 RX ADMIN — VANCOMYCIN HYDROCHLORIDE SCH MLS/HR: 1 INJECTION, POWDER, LYOPHILIZED, FOR SOLUTION INTRAVENOUS at 04:00

## 2019-09-30 RX ADMIN — POTASSIUM CHLORIDE SCH MLS/HR: 200 INJECTION, SOLUTION INTRAVENOUS at 02:28

## 2019-09-30 RX ADMIN — INSULIN LISPRO SCH UNIT: 100 INJECTION, SOLUTION INTRAVENOUS; SUBCUTANEOUS at 08:18

## 2019-09-30 RX ADMIN — POTASSIUM CHLORIDE SCH MLS/HR: 200 INJECTION, SOLUTION INTRAVENOUS at 10:18

## 2019-09-30 RX ADMIN — VANCOMYCIN HYDROCHLORIDE SCH MLS/HR: 1 INJECTION, POWDER, LYOPHILIZED, FOR SOLUTION INTRAVENOUS at 12:35

## 2019-09-30 RX ADMIN — VANCOMYCIN HYDROCHLORIDE SCH MLS/HR: 1 INJECTION, POWDER, LYOPHILIZED, FOR SOLUTION INTRAVENOUS at 19:59

## 2019-09-30 RX ADMIN — Medication SCH: at 18:12

## 2019-09-30 RX ADMIN — ENOXAPARIN SODIUM SCH MG: 60 INJECTION SUBCUTANEOUS at 18:12

## 2019-09-30 RX ADMIN — INSULIN LISPRO SCH UNIT: 100 INJECTION, SOLUTION INTRAVENOUS; SUBCUTANEOUS at 18:11

## 2019-09-30 RX ADMIN — POTASSIUM CHLORIDE SCH MEQ: 20 SOLUTION ORAL at 10:17

## 2019-09-30 RX ADMIN — ATORVASTATIN CALCIUM SCH MG: 10 TABLET, FILM COATED ORAL at 21:15

## 2019-09-30 RX ADMIN — ENOXAPARIN SODIUM SCH MG: 60 INJECTION SUBCUTANEOUS at 06:00

## 2019-09-30 RX ADMIN — POTASSIUM CHLORIDE SCH MLS/HR: 200 INJECTION, SOLUTION INTRAVENOUS at 00:14

## 2019-09-30 RX ADMIN — Medication SCH: at 10:18

## 2019-09-30 RX ADMIN — AZITHROMYCIN SCH MLS/HR: 500 INJECTION, POWDER, LYOPHILIZED, FOR SOLUTION INTRAVENOUS at 00:13

## 2019-09-30 RX ADMIN — GABAPENTIN SCH MG: 100 CAPSULE ORAL at 08:21

## 2019-09-30 RX ADMIN — ACYCLOVIR SCH MG: 400 TABLET ORAL at 21:16

## 2019-09-30 RX ADMIN — ACYCLOVIR SCH MG: 400 TABLET ORAL at 12:35

## 2019-09-30 RX ADMIN — INSULIN LISPRO SCH UNIT: 100 INJECTION, SOLUTION INTRAVENOUS; SUBCUTANEOUS at 21:15

## 2019-09-30 RX ADMIN — AZITHROMYCIN SCH MLS/HR: 500 INJECTION, POWDER, LYOPHILIZED, FOR SOLUTION INTRAVENOUS at 23:52

## 2019-09-30 RX ADMIN — METOPROLOL SUCCINATE SCH MG: 50 TABLET, EXTENDED RELEASE ORAL at 08:19

## 2019-09-30 NOTE — PN
Subjective


Date of Service: 09/30/19


Interval History: 





Patient felt improvement in breathing status. Still had right back pain along 

the rib, on and off. No rashes. 





weight gain 19lb since admission acc to patient





Objective


Active Medications: 








Acetaminophen (Tylenol Tab*)  650 mg PO Q6H PRN


   PRN Reason: PAIN - MILD


Acyclovir (Zovirax Tab*)  800 mg PO FIVE TIMES DAILY [facility]


   Stop: 10/05/19 09:59


   Last Admin: 09/30/19 12:35 Dose:  800 mg


Albuterol/Ipratropium (Duoneb (Albuterol 2.5 Mg/Ipratropium 0.5 Mg))  1 neb INH 

[id number] AWAKE PRN


   PRN Reason: SOB/WHEEZING


   Last Admin: 09/30/19 06:33 Dose:  1 neb


Amiodarone HCl (Cordarone Tab*)  200 mg PO DAILY [facility]


   Last Admin: 09/30/19 08:21 Dose:  200 mg


Atorvastatin Calcium (Lipitor*)  10 mg PO BEDTIME [facility]


   Last Admin: 09/29/19 21:05 Dose:  10 mg


Dextrose (Dextrose 50% Vial 50 Ml*)  25 ml IV PUSH .FOR FS < 60 - SS PRN


   PRN Reason: FS < 60


Enoxaparin Sodium (Lovenox(*))  60 mg SUBCUT 0600,1800 [facility]


   Last Admin: 09/30/19 06:00 Dose:  60 mg


Furosemide (Lasix Tab*)  20 mg PO DAILY [facility]


   Last Admin: 09/30/19 08:21 Dose:  20 mg


Gabapentin (Neurontin Cap(*))  100 mg PO BID [facility]


   Last Admin: 09/30/19 08:21 Dose:  100 mg


Azithromycin (Zithromax 500 Mg/250 Ml)  500 mg in 250 mls @ 250 mls/hr IVPB 

Q24H [facility]


   Last Admin: 09/30/19 00:13 Dose:  250 mls/hr


Vancomycin HCl 1,000 mg/ (Sodium Chloride)  250 mls @ 166.667 mls/hr IVPB Q8H 

[facility]


   Last Admin: 09/30/19 12:35 Dose:  166.667 mls/hr


Potassium Phosphate 15 mmole/ (Sodium Chloride)  255 mls @ 42 mls/hr IVPB ONCE 

ONE


   Stop: 09/30/19 13:58


   Last Admin: 09/30/19 10:18 Dose:  42 mls/hr


Insulin Human Lispro (Humalog*)  0 units SUBCUT ACHS [facility]; Protocol


   Last Admin: 09/30/19 12:35 Dose:  6 unit


Metoprolol Succinate (Toprol Xl Tab*)  50 mg PO DAILY [facility]


   Last Admin: 09/30/19 08:19 Dose:  50 mg


Vicks Vapo Rub  1 dose TOPICAL Q8H [facility]


   Last Admin: 09/30/19 10:18 Dose:  Not Given


Pharmacy Consult (Vancomycin Per Pharmacy*)  1 note FOLLOW UP .VANC PER 

PHARMACY [facility]; Protocol


Pharmacy Profile Note (Vancomycin Trough Check)  1 note FOLLOW UP ONCE ONE


   Stop: 10/01/19 11:31


Potassium Chloride (Potassium Chloride Liquid)  20 meq PO DAILY [facility]


   Last Admin: 09/30/19 10:17 Dose:  20 meq








 Vital Signs - 8 hr











  09/30/19 09/30/19 09/30/19





  06:34 07:51 08:00


 


Pulse Rate 92  


 


Respiratory 15  16





Rate   


 


Blood Pressure  152/70 





(mmHg)   


 


O2 Sat by Pulse 96  





Oximetry   














  09/30/19 09/30/19





  08:21 12:36


 


Pulse Rate  


 


Respiratory 16 16





Rate  


 


Blood Pressure  





(mmHg)  


 


O2 Sat by Pulse  





Oximetry  











Oxygen Devices in Use Now: High Flow Nasal Cannula


Exam: 





Gen: comfortable, not in distress





HEENT: normal





Lungs: right side decreased air entry  (improving); 





Cardiac:  irregular rhythm , HR in 100s





Abdomen: distended, soft, non tender. 





Skin: no rashes seen 





Extremities: No cyanosis. pitting edema on leg, sacral edema





Result Diagrams: 


 09/30/19 06:20





 09/30/19 06:20


Additional Lab and Data: 


 Lab Results











  09/21/19 09/21/19 09/21/19 Range/Units





  22:05 22:05 22:05 


 


WBC  12.2 H    (3.5-10.8)  10^3/uL


 


RBC  5.11 H    (3.70-4.87)  10^6 /uL


 


Hgb  14.0    (12.0-16.0)  g/dL


 


Hct  42    (35-47)  %


 


MCV  82    (80-97)  fL


 


MCH  27    (27-31)  pg


 


MCHC  33    (31-36)  g/dL


 


RDW  14    (10-15)  %


 


Plt Count  164    (150-450)  10^3/uL


 


MPV  8.7    (7.4-10.4)  fL


 


Neut % (Auto)  91.2    %


 


Lymph % (Auto)  4.2    %


 


Mono % (Auto)  4.2    %


 


Eos % (Auto)  0.0    %


 


Baso % (Auto)  0.4    %


 


Absolute Neuts (auto)  11.1 H    (1.5-7.7)  10^3/ul


 


Absolute Lymphs (auto)  0.5 L    (1.0-4.8)  10^3/ul


 


Absolute Monos (auto)  0.5    (0-0.8)  10^3/ul


 


Absolute Eos (auto)  0.0    (0-0.6)  10^3/ul


 


Absolute Basos (auto)  0.1    (0-0.2)  10^3/ul


 


Absolute Nucleated RBC  0.0    10^3/ul


 


Nucleated RBC %  0.0    


 


INR (Anticoag Therapy)   1.26 H   (0.82-1.09)  


 


Sodium    125 L  (135-145)  mmol/L


 


Potassium    3.8  (3.5-5.0)  mmol/L


 


Chloride    95 L  (101-111)  mmol/L


 


Carbon Dioxide    21 L  (22-32)  mmol/L


 


Anion Gap    9  (2-11)  mmol/L


 


BUN    15  (6-24)  mg/dL


 


Creatinine    0.80  (0.51-0.95)  mg/dL


 


Est GFR ( Amer)    87.9  (>60)  


 


Est GFR (Non-Af Amer)    72.7  (>60)  


 


BUN/Creatinine Ratio    18.8  (8-20)  


 


Glucose    287 H  ()  mg/dL


 


Lactic Acid     (0.5-2.0)  mmol/L


 


Calcium    9.1  (8.6-10.3)  mg/dL


 


Total Bilirubin    0.40  (0.2-1.0)  mg/dL


 


AST    26  (13-39)  U/L


 


ALT    21  (7-52)  U/L


 


Alkaline Phosphatase    63  ()  U/L


 


Troponin I    0.10 H*  (<0.04)  ng/mL


 


C-Reactive Protein    399.05 H  (<8.01)  mg/L


 


Total Protein    7.1  (6.4-8.9)  g/dL


 


Albumin    3.6  (3.2-5.2)  g/dL


 


Globulin    3.5  (2-4)  g/dL


 


Albumin/Globulin Ratio    1.0  (1-3)  














  09/21/19 Range/Units





  22:05 


 


WBC   (3.5-10.8)  10^3/uL


 


RBC   (3.70-4.87)  10^6 /uL


 


Hgb   (12.0-16.0)  g/dL


 


Hct   (35-47)  %


 


MCV   (80-97)  fL


 


MCH   (27-31)  pg


 


MCHC   (31-36)  g/dL


 


RDW   (10-15)  %


 


Plt Count   (150-450)  10^3/uL


 


MPV   (7.4-10.4)  fL


 


Neut % (Auto)   %


 


Lymph % (Auto)   %


 


Mono % (Auto)   %


 


Eos % (Auto)   %


 


Baso % (Auto)   %


 


Absolute Neuts (auto)   (1.5-7.7)  10^3/ul


 


Absolute Lymphs (auto)   (1.0-4.8)  10^3/ul


 


Absolute Monos (auto)   (0-0.8)  10^3/ul


 


Absolute Eos (auto)   (0-0.6)  10^3/ul


 


Absolute Basos (auto)   (0-0.2)  10^3/ul


 


Absolute Nucleated RBC   10^3/ul


 


Nucleated RBC %   


 


INR (Anticoag Therapy)   (0.82-1.09)  


 


Sodium   (135-145)  mmol/L


 


Potassium   (3.5-5.0)  mmol/L


 


Chloride   (101-111)  mmol/L


 


Carbon Dioxide   (22-32)  mmol/L


 


Anion Gap   (2-11)  mmol/L


 


BUN   (6-24)  mg/dL


 


Creatinine   (0.51-0.95)  mg/dL


 


Est GFR ( Amer)   (>60)  


 


Est GFR (Non-Af Amer)   (>60)  


 


BUN/Creatinine Ratio   (8-20)  


 


Glucose   ()  mg/dL


 


Lactic Acid  1.9  (0.5-2.0)  mmol/L


 


Calcium   (8.6-10.3)  mg/dL


 


Total Bilirubin   (0.2-1.0)  mg/dL


 


AST   (13-39)  U/L


 


ALT   (7-52)  U/L


 


Alkaline Phosphatase   ()  U/L


 


Troponin I   (<0.04)  ng/mL


 


C-Reactive Protein   (<8.01)  mg/L


 


Total Protein   (6.4-8.9)  g/dL


 


Albumin   (3.2-5.2)  g/dL


 


Globulin   (2-4)  g/dL


 


Albumin/Globulin Ratio   (1-3)  











Microbiology and Other Data: 


 Microbiology











 09/24/19 10:08 Nasal Screen MRSA (PCR) - Final





 Nasal    Mrsa Detected


 


 09/21/19 22:05 Aerobic Blood Culture - Preliminary





 Blood Venous    No Growth Day 2





 Anaerobic Blood Culture - Preliminary





    No Growth Day 2


 


 09/21/19 22:05 Aerobic Blood Culture - Preliminary





 Blood Venous    No Growth Day 2





 Anaerobic Blood Culture - Preliminary





    No Growth Day 2


 


 09/21/19 23:18 Urine Culture - Final





 Urine    No Growth (<1,000 CFU/mL)


 


 09/22/19 12:15 Legionella Urinary Antigen - Final





 Urine    Positive Legionella Antigen





 Streptococcus pneumoniae Ag Screen - Final





    Negative S. pneumo Antigen














Assess/Plan/Problems-Billing


64 year old woman with history of nephrolithiasis, HTN, and tobacco use 

admitted on 9/22 with back pain and found to have RLL pneumonia on the CT abd/

pelvis and urine legionella antigen positive, in ICU from 9/24-9/27 for hypoxic 

respiratory failure requiring Vapotherm,  s/p thoracocentesis yesterday with 

only scant fluid obtained. Her course complicated with possible early shingles 

and anasarca due to malnutrition. 


 











- Patient Problems


(1) Acute hypoxemic respiratory failure


Current Visit: Yes   Status: Acute   Code(s): J96.01 - ACUTE RESPIRATORY 

FAILURE WITH HYPOXIA   SNOMED Code(s): 075637784


   Comment: Legionella pneumonia with right side pleural effusion + MRSA in 

sputum


day 9 azithro; day 5 vanc


requiring ICU stay 9/23-27 for high flow nasal cannula


thoracocentesis 9/27/2019 only scant fluid obtained; LDH/total protein still 

pending


continue abx for now (complete azithromycin tomorrow, vanco completed today), 

wean down O2


may be some component of pulmonary edema related to hypoalbuminemia which is 

unlikely to respond to lasix, but low dose po lasix started yesterday; daily 

weights   





(2) Atrial fibrillation


Current Visit: Yes   Status: Acute   Code(s): I48.91 - UNSPECIFIED ATRIAL 

FIBRILLATION   SNOMED Code(s): 73133402


   Comment: initially with RVR, now resolved


this is a new diagnosis for her


TTE no valvular changes


metoprolol xl, consider up dose if HR still not well controlled


on therapeutic lovenox for [id number] of 2 for now; can be switched to eliquis 

or xarelto prior to discharge


   





(3) Shingles


Current Visit: Yes   Status: Acute   Code(s): B02.9 - ZOSTER WITHOUT 

COMPLICATIONS   SNOMED Code(s): 1883518


   Comment: new onset of right burning sensation in dermatone distribution, 

concerning for shingles although no vesicles seen now.


Start acyclovir 7 days.    





(4) COPD exacerbation


Current Visit: Yes   Status: Acute   Code(s): J44.1 - CHRONIC OBSTRUCTIVE 

PULMONARY DISEASE W (ACUTE) EXACERBATION   SNOMED Code(s): 618953976


   Comment: complicated with acute COPD exacerbation with ongoing pneumonia


completed 3 days of pred burst   





(5) Nicotine dependence


Current Visit: Yes   Status: Acute   Code(s): F17.200 - NICOTINE DEPENDENCE, 

UNSPECIFIED, UNCOMPLICATED   SNOMED Code(s): 47104322


   Comment: on nicotine patch with plans to abstain longterm   





(6) DVT prophylaxis


Current Visit: Yes   Status: Acute   Code(s): Z29.9 - ENCOUNTER FOR 

PROPHYLACTIC MEASURES, UNSPECIFIED   SNOMED Code(s): 627620840


   Comment: therapeutic lovenox   





(7) Anasarca


Current Visit: Yes   Status: Acute   Code(s): R60.1 - GENERALIZED EDEMA   

SNOMED Code(s): 999006701


   Comment: with marked hypoalbuminemia  


urine protein 3+ last week


random UPCR 0.6   


Status and Disposition: 





Inpatient Medicine for acute hypoxic respiratory failure, hypokalemia, 

hypoalbuminemia. 





Attestation


Documenting Resident: Peyton Jane


Supervising Physician: Kimo Moore


Attending/Supervising Physician Comment: 


Agree with plan as outline din today's note by Dr. Jane unless indicated here. 





Note of correction - thoracentesis performed 9/27


Legionella PNA improving on azithromycin. MRSA in sputum receiving vancomycin 

but suspect legionella as source


Hypoalbuminemia - unclear etiology. spot pritein/creatinine elevated but far 

less than 3g/day. Check 24hr collection. Associated with multiple electrolyte 

abnormalities and metabolic acidosis. Repeat check tomorrow.





New afib - rate controlled. Transition to PO NOAC tomorrow


Attestation: 


This service has been performed in part by a resident under the direction of a 

teaching physician.I, Kimo Moore, performed the service, or was physically 

present during the critical, or key portions of the service, furnished by the 

resident. I participated in the management of the patient.

## 2019-09-30 NOTE — PN
Hospitalist Progress Note


Date of Service: 09/30/19


Subjective-


   pt breathing is better w new o2 canula, bilateral flank pain on palpation, 

swelling and edema on legs (1+), not eating cause no appetite, gained 19lbs in 

last 2 days





           overnight event of SOB(switched to 3mL O2 and improved)





Objective-





vitals


temp-97.7


heart rate-92


resp. rate-15


02 sat-96


o2 flow rate-3


BP-152/70





general-welll apeearing


heart-s1, s2 reg rate rhythm


-lungs-bilateral lungs inspiratory rhonchi


-abdomen soft, normal, nondistended


-extremities- BL legs 1+ edema up to thighs and abdomen swelling


-skin -Bilateral back flank pain on palpation and pain everywhere on back (from 

lying down)





labs-


rbs-3.61


hgb-10.1


hct-29


abs neutrophils- .6


k-3.1


CO2-34


Glucose- 158


Ca-7.5


Phosphorus-1.7 





Assessment/Plan-





64 year old woman with history of nephrolithiasis, HTN,, DIABETES, OSTEOPOROSIS

, MULTIPLE STENTS and tobacco use admitted on 9/22 with back pain and found to 

have RLL pneumonia on the CT abd/pelvis and urine legionella antigen positive, 

in ICU from 9/24-9/27 for hypoxic respiratory failure requiring Vapotherm,  s/p 

thoracocentesis yesterday with only scant fluid obtained. Her course 

complicated with possible early shingles and anasarca due to malnutrition. 





Acute hypoxemic respiratory failure, Legionella pneumonia with right side 

pleural effusion + MRSA in sputum


day 7 azithro(500mg in 250mls/hr @250mls/hr IV); day 4 vanc


required ICU stay 9/23-27 for high flow nasal cannula


thoracocentesis 9/27/2019 only scant fluid obtained; LDH/total protein are 

pending


continue abx for now, wean down O2


may be some component of pulmonary edema related to hypoalbuminemia which is 

unlikely to respond to lasix, but low dose po lasix(20mg PO daily)_ started 

yesterday; add daily weights   





(2) Legionella pneumonia


today is day 7 of azithromycin 500 mg in 250 mls @250 mls/hr


will treat for 10 days given the severity of her pneumonia    





(3) Atrial fibrillation. This is a new diagnosis for her


TTE no valvular changes


switch metoprolol (50mg PO daily)good rate control


on therapeutic lovenox(enoxapaprin 60mg subcut) for [id number] of 2 for now; 

can be switched to eliquis or xarelto prior to discharge


   





(4) Hypokalemia maybe related to alkalosis?  


replete aggressively today and recheck this afternoon   -- K Cl 20 meq PO daily

, K P 255mls/hr @42 mls/hr





(5) Anasarca with marked hypoalbuminemia  


-urine last week had 3+ protein... will quantify urine protein to eval for 

nephrotic syndrome; if positive will need more extensive work up 


   





(6) Shingles


-new onset of right burning sensation in dermatone distribution, concerning for 

shingles although no vesicles seen now.


Start acyclovir (800mg PO 5x daily)7 days.    





(7) COPD exacerbation


-complicated with acute COPD exacerbation with ongoing pneumonia


completed 3 days of prednisone burst   





(8) Nicotine dependence


- on nicotine patch with plans to abstain longterm   





(9) DVT prophylaxis


-therapeutic enoxaparin 60mg subcut

## 2019-09-30 NOTE — PN
Progress Note





- Progress Note


Date of Service: 09/30/19


SOAP: 


Subjective:


CC: Pneumonia





HPI:


Ms. Cortes is a 65 yo female with PMH significant for DM2, HTN, HLD, and 

osteoporosis; who presented to the hospital for fevers and was admitted for 

pneumonia. Denies fever, chills, nausea, vomiting, or diarrhea. Occasional 

cough that is non productive. She states that her shortness of breath is 

improving, has been working on weaning O2. She states that she is starting to 

feel better. Over the weekend she developed a burning sensation on the left side

, it was felt to be shingles and she was started on Acyclovir. She denies any 

rash or open areas.





Objective:


 Vital Signs - 8 hr











  09/30/19 09/30/19 09/30/19





  03:15 06:34 07:51


 


Temperature 97.7 F  


 


Pulse Rate 100 92 


 


Respiratory 20 15 





Rate   


 


Blood Pressure 156/69  152/70





(mmHg)   


 


O2 Sat by Pulse 94 96 





Oximetry   








Physical Exam:


General: NAD, sitting up in bed


Neurological: Alert and Oriented


HEENT: Moist MM, no thrush


Cardiovascular: Heart rate regular. 1+ bilateral LE edema


Respiratory: Lung sounds clear, diminished


Abdominal: Bowel sounds present; ABD soft, non tender and non distended


Skin: No rash. There are 3 small macules on the left posterior side below her 

scapula that are in a line, about 0.3 cm in diameter that are slightly darker 

than her skin color. No vesicles. She reports pain is in this location.





 Laboratory Results - last 24 hr











  09/29/19 09/29/19 09/29/19





  18:25 20:57 23:00


 


Sodium  140  


 


Potassium  2.8 L  


 


Chloride  100 L  


 


Carbon Dioxide  34 H  


 


Anion Gap  6  


 


BUN  8  


 


Creatinine  0.74  


 


Est GFR ( Amer)  96.2  


 


Est GFR (Non-Af Amer)  79.5  


 


BUN/Creatinine Ratio  10.8  


 


Glucose  253 H  


 


POC Glucose (mg/dL)   224 H 


 


Calcium  7.6 L  


 


Magnesium  2.3  


 


Ur Creatinine Concen    26.75


 


Ur Total Protein Conc    18














  09/30/19 09/30/19 09/30/19





  06:20 06:20 07:42


 


WBC  8.3  


 


RBC  3.61 L  


 


Hgb  10.1 L  


 


Hct  29 L  


 


MCV  81  


 


MCH  28  


 


MCHC  35  


 


RDW  14  


 


Plt Count  350  


 


MPV  7.8  


 


Neut % (Auto)  81.2  


 


Lymph % (Auto)  7.4  


 


Mono % (Auto)  9.7  


 


Eos % (Auto)  1.4  


 


Baso % (Auto)  0.3  


 


Absolute Neuts (auto)  6.7  


 


Absolute Lymphs (auto)  0.6 L  


 


Absolute Monos (auto)  0.8  


 


Absolute Eos (auto)  0.1  


 


Absolute Basos (auto)  0.0  


 


Absolute Nucleated RBC  0.0  


 


Nucleated RBC %  0.0  


 


Sodium   143 


 


Potassium   3.1 L 


 


Chloride   104 


 


Carbon Dioxide   34 H 


 


Anion Gap   5 


 


BUN   6 


 


Creatinine   0.73 


 


Est GFR ( Amer)   97.7 


 


Est GFR (Non-Af Amer)   80.8 


 


BUN/Creatinine Ratio   8.2 


 


Glucose   158 H 


 


POC Glucose (mg/dL)    242 H


 


Calcium   7.5 L 


 


Phosphorus   1.7 L 


 


Magnesium   2.0 








 Microbiology











 09/27/19 14:30 Gram Stain - Final





 Pleural Fluid Body Fluid Culture - Preliminary





    No Growth Day 3


 


 09/21/19 22:05 Aerobic Blood Culture - Final





 Blood Venous    No Growth Day 5





 Anaerobic Blood Culture - Final





    No Growth Day 5


 


 09/21/19 22:05 Aerobic Blood Culture - Final





 Blood Venous    No Growth Day 5





 Anaerobic Blood Culture - Final





    No Growth Day 5


 


 09/24/19 15:58 Gram Stain - Final





 No Source Provided Sputum Culture - Final





    MRSA





    Normal Maury


 


 09/24/19 10:08 Nasal Screen MRSA (PCR) - Final





 Nasal    Mrsa Detected


 


 09/21/19 23:18 Urine Culture - Final





 Urine    No Growth (<1,000 CFU/mL)


 


 09/22/19 12:15 Legionella Urinary Antigen - Final





 Urine    Positive Legionella Antigen





 Streptococcus pneumoniae Ag Screen - Final





    Negative S. pneumo Antigen








Assessment:


1. Community Acquired PNA, Legionella. Blood cultures with no growth to date. 

Urine antigen for Legionella positive. Sputum culture with normal maury and 

MRSA. She was not improving on legionella treatment alone, and ABX were 

broadened. Has been on Azithromycin and Vancomycin. Initial leukocytosis has 

resolved. Afebrile since 9/26. Continues to have mild tachycardia. 


2. Acute hypoxic respiratory failure. Improving, vapotherm weaned and now on 

2.5 L via NC. She reports that she was down to 1.5 L last night but developed 

shortness of breath after someone entered her room wearing perfume. 


3. Left side pain. She was started on Acyclovir for possible Herpes Zoster over 

the weekend, no vesicles were seen. There is no rash or vesicles, but 3 small 

macules as described above. Pain is improving since starting the Acyclovir. 

This may represent a Herpes Zoster in the setting of stress from PNA. 


4. DM2.





Plan:


Continue Vancomycin (day 5) and Azithromycin (day 9). Will plan for a 10 day 

course of Azithromycin and a 5 day course of Vancomycin. Continue Acyclovir for 

total of 7 days (day 3/7)

## 2019-10-01 LAB
ANION GAP SERPL CALC-SCNC: 5 MMOL/L (ref 2–11)
BASOPHILS # BLD AUTO: 0.1 10^3/UL (ref 0–0.2)
BUN SERPL-MCNC: 6 MG/DL (ref 6–24)
BUN/CREAT SERPL: 6.5 (ref 8–20)
CALCIUM SERPL-MCNC: 7.9 MG/DL (ref 8.6–10.3)
CHLORIDE SERPL-SCNC: 102 MMOL/L (ref 101–111)
EOSINOPHIL # BLD AUTO: 0.1 10^3/UL (ref 0–0.6)
GLUCOSE SERPL-MCNC: 132 MG/DL (ref 70–100)
HCO3 SERPL-SCNC: 39 MMOL/L (ref 22–32)
HCT VFR BLD AUTO: 31 % (ref 35–47)
HGB BLD-MCNC: 10.5 G/DL (ref 12–16)
LYMPHOCYTES # BLD AUTO: 0.7 10^3/UL (ref 1–4.8)
MCH RBC QN AUTO: 28 PG (ref 27–31)
MCHC RBC AUTO-ENTMCNC: 34 G/DL (ref 31–36)
MCV RBC AUTO: 81 FL (ref 80–97)
MONOCYTES # BLD AUTO: 1.1 10^3/UL (ref 0–0.8)
NEUTROPHILS # BLD AUTO: 7.8 10^3/UL (ref 1.5–7.7)
NRBC # BLD AUTO: 0 10^3/UL
NRBC BLD QL AUTO: 0
PLATELET # BLD AUTO: 382 10^3/UL (ref 150–450)
POTASSIUM SERPL-SCNC: 2.8 MMOL/L (ref 3.5–5)
PROT UR-MCNC: 8 MG/DL
RBC # BLD AUTO: 3.82 10^6 /UL (ref 3.7–4.87)
SODIUM SERPL-SCNC: 146 MMOL/L (ref 135–145)
WBC # BLD AUTO: 9.8 10^3/UL (ref 3.5–10.8)

## 2019-10-01 RX ADMIN — POTASSIUM CHLORIDE SCH MEQ: 20 SOLUTION ORAL at 20:12

## 2019-10-01 RX ADMIN — POTASSIUM CHLORIDE SCH: 200 INJECTION, SOLUTION INTRAVENOUS at 16:20

## 2019-10-01 RX ADMIN — LOSARTAN POTASSIUM SCH MG: 25 TABLET, FILM COATED ORAL at 09:23

## 2019-10-01 RX ADMIN — AMIODARONE HYDROCHLORIDE SCH MG: 200 TABLET ORAL at 08:27

## 2019-10-01 RX ADMIN — ACYCLOVIR SCH: 400 TABLET ORAL at 16:21

## 2019-10-01 RX ADMIN — ACYCLOVIR SCH MG: 400 TABLET ORAL at 05:29

## 2019-10-01 RX ADMIN — GABAPENTIN SCH MG: 100 CAPSULE ORAL at 08:28

## 2019-10-01 RX ADMIN — METOPROLOL SUCCINATE SCH MG: 25 TABLET, EXTENDED RELEASE ORAL at 11:29

## 2019-10-01 RX ADMIN — ACYCLOVIR SCH MG: 400 TABLET ORAL at 08:27

## 2019-10-01 RX ADMIN — FUROSEMIDE SCH MG: 20 TABLET ORAL at 08:28

## 2019-10-01 RX ADMIN — POTASSIUM CHLORIDE SCH MLS/HR: 200 INJECTION, SOLUTION INTRAVENOUS at 13:38

## 2019-10-01 RX ADMIN — INSULIN LISPRO SCH UNIT: 100 INJECTION, SOLUTION INTRAVENOUS; SUBCUTANEOUS at 08:28

## 2019-10-01 RX ADMIN — ATORVASTATIN CALCIUM SCH MG: 10 TABLET, FILM COATED ORAL at 20:12

## 2019-10-01 RX ADMIN — POTASSIUM CHLORIDE SCH MLS/HR: 200 INJECTION, SOLUTION INTRAVENOUS at 11:29

## 2019-10-01 RX ADMIN — POTASSIUM CHLORIDE SCH MEQ: 20 SOLUTION ORAL at 08:28

## 2019-10-01 RX ADMIN — Medication SCH: at 09:24

## 2019-10-01 RX ADMIN — POTASSIUM CHLORIDE SCH MEQ: 20 SOLUTION ORAL at 11:52

## 2019-10-01 RX ADMIN — GABAPENTIN SCH MG: 100 CAPSULE ORAL at 20:12

## 2019-10-01 RX ADMIN — INSULIN LISPRO SCH: 100 INJECTION, SOLUTION INTRAVENOUS; SUBCUTANEOUS at 17:09

## 2019-10-01 RX ADMIN — ENOXAPARIN SODIUM SCH MG: 60 INJECTION SUBCUTANEOUS at 05:29

## 2019-10-01 RX ADMIN — VANCOMYCIN HYDROCHLORIDE SCH MLS/HR: 1 INJECTION, POWDER, LYOPHILIZED, FOR SOLUTION INTRAVENOUS at 04:05

## 2019-10-01 RX ADMIN — INSULIN LISPRO SCH UNIT: 100 INJECTION, SOLUTION INTRAVENOUS; SUBCUTANEOUS at 13:39

## 2019-10-01 RX ADMIN — METOPROLOL SUCCINATE SCH MG: 50 TABLET, EXTENDED RELEASE ORAL at 08:26

## 2019-10-01 RX ADMIN — Medication SCH: at 02:00

## 2019-10-01 RX ADMIN — Medication SCH: at 17:51

## 2019-10-01 RX ADMIN — VANCOMYCIN HYDROCHLORIDE SCH MLS/HR: 1 INJECTION, POWDER, LYOPHILIZED, FOR SOLUTION INTRAVENOUS at 13:38

## 2019-10-01 NOTE — PN
Hospitalist Progress Note


Date of Service: 10/01/19





Subjective-


   pt breathing is better w new o2 canula, swelling and edema on legs has 

improved. Lost 2 lbs since yesterday





           overnight event of pt looking pale. Pt was on room air w/ O2 sat. of 

87%. O2 on 2.5L and O2 increased to 94%





Objective-





vitals


temp-98.5


heart rate-88


resp. rate-17


02 sat-96


o2 flow rate-2.5


BP-159/80





general-welll apeearing


heart-s1, s2 reg rate rhythm


-lungs-bilateral lungs inspiratory rhonchi


-abdomen soft, normal, nondistended


-extremities- BL legs 1+ edema up to thighs and abdomen swelling(improved 

swelling in legs)


-skin -Bilateral back flank pain on palpation and pain everywhere on back (from 

lying down)





labs-


hgb-10.5


hct-31


abs neutrophils- 7.8


abs lymphocytes-0.7


abs monocytes- 1.1


Na-146


k-2.8


CO2-39


bun-6


creat-.93


BUN/creat-6.5


Glucose- 132


Ca-7.9 (improved)


Phosphorus (normal now 2.9)





Assessment/Plan-





64 year old woman with history of nephrolithiasis,HTN, Diabetes,osteoporosis, 

multiple past stents and tobacco use admitted on 9/22 with back pain and found 

to have RLL pneumonia on the CT abd/pelvis and urine legionella antigen positive

, in ICU from 9/24-9/27 for hypoxic respiratory failure requiring Vapotherm,  s/

p thoracocentesis yesterday with only scant fluid obtained. Her course 

complicated with possible early shingles and anasarca due to malnutrition. 





(1)Acute hypoxemic respiratory failure, Legionella pneumonia with right side 

pleural effusion + MRSA in sputum


. Azithromycin completed. day 10 azithro(500mg in 250mls/hr @250mls/hr IV); day 

5 vancomycin 1,000 mg. 250 mls @166.667 mls/hr IV Q8hrs


required ICU stay 9/23-27 for high flow nasal cannula


thoracocentesis 9/27/2019 only scant fluid obtained; LDH/total protein are 

pending


continue abx for now, wean down O2


may be some component of pulmonary edema related to hypoalbuminemia which is 

unlikely to respond to lasix, but low dose po lasix(20mg PO daily)_ started 

yesterday; add daily weights   





(2) Legionella pneumonia


Azithromycin completed. Today is day 10 of azithromycin 500 mg in 250 mls @250 

mls/hr


  





(3) Atrial fibrillation. This is a new diagnosis for her


TTE no valvular changes


switch metoprolol (75mg PO daily)


on Rivaroxaban 20mg PO QPM for [id number] of 2 for now; can be switched to 

eliquis or xarelto prior to discharge


   





(4) Hypokalemia maybe related to alkalosis?  


replete aggressively today and recheck this afternoon   -- K Cl 20 meq PO daily





(5) Anasarca with marked hypoalbuminemia  


-urine last week had 3+ protein. Urine 2 days ago has albumin of 2.0 (low)


   -Furosemide 20mg PO daily





(6) Shingles


-new onset of right burning sensation in dermatone distribution, concerning for 

shingles although no vesicles seen now.


-Acyclovir (800mg PO 5x daily)7 days.    


-Gabapentin 100mg PO BID(for pain)





(7) COPD exacerbation


-complicated with acute COPD exacerbation with ongoing pneumonia


completed 3 days of prednisone burst   





(8) Nicotine dependence


- on nicotine patch with plans to abstain longterm   





(9)Diabetes


- lispro sliding scale


-Glipizide 5 mg PO daily





(10) DVT prophylaxis


-  Rivaroxaban 20mg PO QPM

## 2019-10-01 NOTE — PN
Subjective


Date of Service: 10/01/19


Interval History: 





Continues to wean down O2 to 2.5L/min


Swelling improved with diuretics. 





No complains of chest pain, SOB. less cough. 





Objective


Active Medications: 








Acetaminophen (Tylenol Tab*)  650 mg PO Q6H PRN


   PRN Reason: PAIN - MILD


Acyclovir (Zovirax Tab*)  800 mg PO FIVE TIMES DAILY [facility]


   Stop: 10/05/19 09:59


   Last Admin: 10/01/19 08:27 Dose:  800 mg


Albuterol/Ipratropium (Duoneb (Albuterol 2.5 Mg/Ipratropium 0.5 Mg))  1 neb INH 

[id number] AWAKE PRN


   PRN Reason: SOB/WHEEZING


   Last Admin: 09/30/19 06:33 Dose:  1 neb


Amiodarone HCl (Cordarone Tab*)  200 mg PO DAILY [facility]


   Last Admin: 10/01/19 08:27 Dose:  200 mg


Atorvastatin Calcium (Lipitor*)  10 mg PO BEDTIME [facility]


   Last Admin: 09/30/19 21:15 Dose:  10 mg


Dextrose (Dextrose 50% Vial 50 Ml*)  25 ml IV PUSH .FOR FS < 60 - SS PRN


   PRN Reason: FS < 60


Gabapentin (Neurontin Cap(*))  100 mg PO BID [facility]


   Last Admin: 10/01/19 08:28 Dose:  100 mg


Glipizide (Glucotrol Tab*)  5 mg PO DAILY [facility]


   Last Admin: 10/01/19 09:23 Dose:  5 mg


Insulin Human Lispro (Humalog*)  0 units SUBCUT ACHS [facility]; Protocol


   Last Admin: 10/01/19 13:39 Dose:  3 unit


Losartan Potassium (Cozaar Tab*)  25 mg PO DAILY [facility]


   Last Admin: 10/01/19 09:23 Dose:  25 mg


Metoprolol Succinate (Toprol Xl Tab*)  25 mg PO DAILY [facility]


   Last Admin: 10/01/19 11:29 Dose:  25 mg


Vicks Vapo Rub  1 dose TOPICAL Q8H [facility]


   Last Admin: 10/01/19 09:24 Dose:  Not Given


Potassium Chloride (Potassium Chloride Liquid)  20 meq PO BID [facility]


   Last Admin: 10/01/19 11:52 Dose:  20 meq


Rivaroxaban (Xarelto(*))  20 mg PO QPM [facility]








 Vital Signs - 8 hr











  10/01/19 10/01/19





  08:00 08:28


 


Respiratory 19 17





Rate  


 


O2 Sat by Pulse 97 





Oximetry  











Oxygen Devices in Use Now: Nasal Cannula


Exam: 





Gen: comfortable, not in distress





HEENT: normal





Lungs: right basal creps, left basal crackles





Cardiac:  irregular rhythm , HR in 100s





Abdomen: distended, soft, non tender. 





Skin: no rashes seen 





Extremities: No cyanosis.trace edema on legs


Result Diagrams: 


 10/01/19 07:54





 10/01/19 07:54


Additional Lab and Data: 


 Lab Results











  09/21/19 09/21/19 09/21/19 Range/Units





  22:05 22:05 22:05 


 


WBC  12.2 H    (3.5-10.8)  10^3/uL


 


RBC  5.11 H    (3.70-4.87)  10^6 /uL


 


Hgb  14.0    (12.0-16.0)  g/dL


 


Hct  42    (35-47)  %


 


MCV  82    (80-97)  fL


 


MCH  27    (27-31)  pg


 


MCHC  33    (31-36)  g/dL


 


RDW  14    (10-15)  %


 


Plt Count  164    (150-450)  10^3/uL


 


MPV  8.7    (7.4-10.4)  fL


 


Neut % (Auto)  91.2    %


 


Lymph % (Auto)  4.2    %


 


Mono % (Auto)  4.2    %


 


Eos % (Auto)  0.0    %


 


Baso % (Auto)  0.4    %


 


Absolute Neuts (auto)  11.1 H    (1.5-7.7)  10^3/ul


 


Absolute Lymphs (auto)  0.5 L    (1.0-4.8)  10^3/ul


 


Absolute Monos (auto)  0.5    (0-0.8)  10^3/ul


 


Absolute Eos (auto)  0.0    (0-0.6)  10^3/ul


 


Absolute Basos (auto)  0.1    (0-0.2)  10^3/ul


 


Absolute Nucleated RBC  0.0    10^3/ul


 


Nucleated RBC %  0.0    


 


INR (Anticoag Therapy)   1.26 H   (0.82-1.09)  


 


Sodium    125 L  (135-145)  mmol/L


 


Potassium    3.8  (3.5-5.0)  mmol/L


 


Chloride    95 L  (101-111)  mmol/L


 


Carbon Dioxide    21 L  (22-32)  mmol/L


 


Anion Gap    9  (2-11)  mmol/L


 


BUN    15  (6-24)  mg/dL


 


Creatinine    0.80  (0.51-0.95)  mg/dL


 


Est GFR ( Amer)    87.9  (>60)  


 


Est GFR (Non-Af Amer)    72.7  (>60)  


 


BUN/Creatinine Ratio    18.8  (8-20)  


 


Glucose    287 H  ()  mg/dL


 


Lactic Acid     (0.5-2.0)  mmol/L


 


Calcium    9.1  (8.6-10.3)  mg/dL


 


Total Bilirubin    0.40  (0.2-1.0)  mg/dL


 


AST    26  (13-39)  U/L


 


ALT    21  (7-52)  U/L


 


Alkaline Phosphatase    63  ()  U/L


 


Troponin I    0.10 H*  (<0.04)  ng/mL


 


C-Reactive Protein    399.05 H  (<8.01)  mg/L


 


Total Protein    7.1  (6.4-8.9)  g/dL


 


Albumin    3.6  (3.2-5.2)  g/dL


 


Globulin    3.5  (2-4)  g/dL


 


Albumin/Globulin Ratio    1.0  (1-3)  














  09/21/19 Range/Units





  22:05 


 


WBC   (3.5-10.8)  10^3/uL


 


RBC   (3.70-4.87)  10^6 /uL


 


Hgb   (12.0-16.0)  g/dL


 


Hct   (35-47)  %


 


MCV   (80-97)  fL


 


MCH   (27-31)  pg


 


MCHC   (31-36)  g/dL


 


RDW   (10-15)  %


 


Plt Count   (150-450)  10^3/uL


 


MPV   (7.4-10.4)  fL


 


Neut % (Auto)   %


 


Lymph % (Auto)   %


 


Mono % (Auto)   %


 


Eos % (Auto)   %


 


Baso % (Auto)   %


 


Absolute Neuts (auto)   (1.5-7.7)  10^3/ul


 


Absolute Lymphs (auto)   (1.0-4.8)  10^3/ul


 


Absolute Monos (auto)   (0-0.8)  10^3/ul


 


Absolute Eos (auto)   (0-0.6)  10^3/ul


 


Absolute Basos (auto)   (0-0.2)  10^3/ul


 


Absolute Nucleated RBC   10^3/ul


 


Nucleated RBC %   


 


INR (Anticoag Therapy)   (0.82-1.09)  


 


Sodium   (135-145)  mmol/L


 


Potassium   (3.5-5.0)  mmol/L


 


Chloride   (101-111)  mmol/L


 


Carbon Dioxide   (22-32)  mmol/L


 


Anion Gap   (2-11)  mmol/L


 


BUN   (6-24)  mg/dL


 


Creatinine   (0.51-0.95)  mg/dL


 


Est GFR ( Amer)   (>60)  


 


Est GFR (Non-Af Amer)   (>60)  


 


BUN/Creatinine Ratio   (8-20)  


 


Glucose   ()  mg/dL


 


Lactic Acid  1.9  (0.5-2.0)  mmol/L


 


Calcium   (8.6-10.3)  mg/dL


 


Total Bilirubin   (0.2-1.0)  mg/dL


 


AST   (13-39)  U/L


 


ALT   (7-52)  U/L


 


Alkaline Phosphatase   ()  U/L


 


Troponin I   (<0.04)  ng/mL


 


C-Reactive Protein   (<8.01)  mg/L


 


Total Protein   (6.4-8.9)  g/dL


 


Albumin   (3.2-5.2)  g/dL


 


Globulin   (2-4)  g/dL


 


Albumin/Globulin Ratio   (1-3)  











Microbiology and Other Data: 


 Microbiology











 09/24/19 10:08 Nasal Screen MRSA (PCR) - Final





 Nasal    Mrsa Detected


 


 09/21/19 22:05 Aerobic Blood Culture - Preliminary





 Blood Venous    No Growth Day 2





 Anaerobic Blood Culture - Preliminary





    No Growth Day 2


 


 09/21/19 22:05 Aerobic Blood Culture - Preliminary





 Blood Venous    No Growth Day 2





 Anaerobic Blood Culture - Preliminary





    No Growth Day 2


 


 09/21/19 23:18 Urine Culture - Final





 Urine    No Growth (<1,000 CFU/mL)


 


 09/22/19 12:15 Legionella Urinary Antigen - Final





 Urine    Positive Legionella Antigen





 Streptococcus pneumoniae Ag Screen - Final





    Negative S. pneumo Antigen














Assess/Plan/Problems-Billing


64 year old woman with history of nephrolithiasis, HTN, and tobacco use 

admitted on 9/22 with back pain and found to have RLL pneumonia on the CT abd/

pelvis and urine legionella antigen positive, in ICU from 9/24-9/27 for hypoxic 

respiratory failure requiring Vapotherm,  s/p thoracocentesis yesterday with 

only scant fluid obtained. Her course complicated with possible early shingles 

and anasarca due to malnutrition. 


 











- Patient Problems


(1) Acute hypoxemic respiratory failure


Current Visit: Yes   Status: Acute   Code(s): J96.01 - ACUTE RESPIRATORY 

FAILURE WITH HYPOXIA   SNOMED Code(s): 607042230


   Comment: Legionella pneumonia with right side pleural effusion + MRSA in 

sputum


day 10 azithro; day 6 vanc, off abx


requiring ICU stay 9/23-27 for high flow nasal cannula


thoracocentesis 9/27/2019 only scant fluid obtained; LDH/total protein still 

pending


continue abx for now (complete azithromycin tomorrow, vanco completed today), 

wean down O2


may be some component of pulmonary edema related to hypoalbuminemia which is 

unlikely to respond to lasix, but low dose po lasix started yesterday; daily 

weights   





(2) Atrial fibrillation


Current Visit: Yes   Status: Acute   Code(s): I48.91 - UNSPECIFIED ATRIAL 

FIBRILLATION   SNOMED Code(s): 41714626


   Comment: initially with RVR, now resolving 


this is a new diagnosis for her


TTE no valvular changes


increase metoprolol xl to 75 mg daily today


on therapeutic lovenox for [id number] of 2 for now; can be switched to eliquis 

or xarelto prior to discharge   





(3) Shingles


Current Visit: Yes   Status: Acute   Code(s): B02.9 - ZOSTER WITHOUT 

COMPLICATIONS   SNOMED Code(s): 4868764


   Comment: new onset of right burning sensation in dermatone distribution, 

concerning for shingles although no vesicles seen now.


Start acyclovir 7 days.    





(4) COPD exacerbation


Current Visit: Yes   Status: Acute   Code(s): J44.1 - CHRONIC OBSTRUCTIVE 

PULMONARY DISEASE W (ACUTE) EXACERBATION   SNOMED Code(s): 995958544


   Comment: complicated with acute COPD exacerbation with ongoing pneumonia


completed 3 days of pred burst   





(5) Nicotine dependence


Current Visit: Yes   Status: Acute   Code(s): F17.200 - NICOTINE DEPENDENCE, 

UNSPECIFIED, UNCOMPLICATED   SNOMED Code(s): 96163176


   Comment: on nicotine patch with plans to abstain longterm   





(6) DVT prophylaxis


Current Visit: Yes   Status: Acute   Code(s): Z29.9 - ENCOUNTER FOR 

PROPHYLACTIC MEASURES, UNSPECIFIED   SNOMED Code(s): 156489356


   Comment: therapeutic lovenox   





(7) Anasarca


Current Visit: Yes   Status: Acute   Code(s): R60.1 - GENERALIZED EDEMA   

SNOMED Code(s): 709834692


   Comment: with marked hypoalbuminemia  


urine protein 3+ last week


random UPCR 0.6


do 24 urine proterin.    


Status and Disposition: 





Inpatient Medicine for acute hypoxic respiratory failure, hypokalemia, 

hypoalbuminemia. 





Attestation


Documenting Resident: Peyton Jane


Supervising Physician: Kimo Moore


Attending/Supervising Physician Comment: 





Agree with plan as outline din Dr. Jane's note unless indicated. 





Legionella PNA completing abx today


Continued hypoxia - ambulate and teat IS


Hypokalemia - stop lasix, now euvolemic 


Afib - increase metoprolol and transition to oral NOAC


Attestation: 


This service has been performed in part by a resident under the direction of a 

teaching physician.I, Kimo Moore, performed the service, or was physically 

present during the critical, or key portions of the service, furnished by the 

resident. I participated in the management of the patient.

## 2019-10-02 VITALS — DIASTOLIC BLOOD PRESSURE: 78 MMHG | SYSTOLIC BLOOD PRESSURE: 154 MMHG

## 2019-10-02 LAB
ANION GAP SERPL CALC-SCNC: 7 MMOL/L (ref 2–11)
BASOPHILS # BLD AUTO: 0 10^3/UL (ref 0–0.2)
BUN SERPL-MCNC: 10 MG/DL (ref 6–24)
BUN/CREAT SERPL: 9.6 (ref 8–20)
CALCIUM SERPL-MCNC: 8.5 MG/DL (ref 8.6–10.3)
CHLORIDE SERPL-SCNC: 100 MMOL/L (ref 101–111)
EOSINOPHIL # BLD AUTO: 0.1 10^3/UL (ref 0–0.6)
GLUCOSE SERPL-MCNC: 164 MG/DL (ref 70–100)
HCO3 SERPL-SCNC: 38 MMOL/L (ref 22–32)
HCT VFR BLD AUTO: 29 % (ref 35–47)
HGB BLD-MCNC: 10 G/DL (ref 12–16)
LYMPHOCYTES # BLD AUTO: 0.8 10^3/UL (ref 1–4.8)
MCH RBC QN AUTO: 28 PG (ref 27–31)
MCHC RBC AUTO-ENTMCNC: 34 G/DL (ref 31–36)
MCV RBC AUTO: 83 FL (ref 80–97)
MONOCYTES # BLD AUTO: 1.1 10^3/UL (ref 0–0.8)
NEUTROPHILS # BLD AUTO: 6.4 10^3/UL (ref 1.5–7.7)
NRBC # BLD AUTO: 0 10^3/UL
NRBC BLD QL AUTO: 0
PLATELET # BLD AUTO: 407 10^3/UL (ref 150–450)
POTASSIUM SERPL-SCNC: 3.2 MMOL/L (ref 3.5–5)
RBC # BLD AUTO: 3.51 10^6 /UL (ref 3.7–4.87)
SODIUM SERPL-SCNC: 145 MMOL/L (ref 135–145)
WBC # BLD AUTO: 8.5 10^3/UL (ref 3.5–10.8)

## 2019-10-02 RX ADMIN — Medication SCH: at 10:49

## 2019-10-02 RX ADMIN — LOSARTAN POTASSIUM SCH MG: 25 TABLET, FILM COATED ORAL at 10:07

## 2019-10-02 RX ADMIN — Medication SCH: at 02:43

## 2019-10-02 RX ADMIN — METOPROLOL SUCCINATE SCH MG: 25 TABLET, EXTENDED RELEASE ORAL at 10:07

## 2019-10-02 RX ADMIN — POTASSIUM CHLORIDE SCH MEQ: 20 SOLUTION ORAL at 10:08

## 2019-10-02 RX ADMIN — AMIODARONE HYDROCHLORIDE SCH MG: 200 TABLET ORAL at 10:06

## 2019-10-02 RX ADMIN — GABAPENTIN SCH MG: 100 CAPSULE ORAL at 10:06

## 2019-10-02 NOTE — DS
CC:  Dr. Hillman; Dr. Bowers *

 

DISCHARGE SUMMARY:

 

DATE OF ADMISSION:  09/22/19

 

DATE OF DISCHARGE:  10/02/19

 

PRIMARY CARE PROVIDER:  Dr. Hillman.

 

DISPOSITION:  Discharged to home.

 

CONDITION ON DISCHARGE:  Good.

 

PRIMARY DIAGNOSES:

1.  Pneumonia secondary to legionella disease, legionella pneumonia.

2.  New atrial fibrillation.

3.  Uncomplicated parapneumonic effusion.

4.  Hypokalemia.

5.  Shingles.

6.  Proteinuria.

7.  Hypoxic respiratory failure.

 

SECONDARY DIAGNOSES:

1.  Diabetes.

2.  Chronic obstructive pulmonary disease.

3.  Hypertension.

4.  Dyslipidemia.

 

MEDICATIONS ON DISCHARGE:  Include:

1.  Glipizide 5 mg daily.

2.  Fluticasone 220 two puffs twice daily.

3.  Atorvastatin 10 mg in the evening.

4.  Alendronate 70 mg in the evening.

5.  Albuterol 2 puffs every 6 hours.

6.  Rivaroxaban 20 mg in the evening.

7.  Potassium chloride 40 mEq twice daily.

8.  Metoprolol succinate 25 mg daily.

9.  Losartan 25 mg daily.

10.  Gabapentin 100 mg twice daily.

11.  Amiodarone 200 mg daily.

 

FOLLOWUP INSTRUCTIONS:  At followup, please:

1.  Follow BMP for potassium level.

2.  Further evaluation of proteinuria as deemed necessary.

3.  Ensure followup with Dr. Bowers as well as resolution of pleural effusion.

 

PROCEDURES PERFORMED DURING THE HOSPITAL STAY:  Thoracentesis, 09/27/19, 
ultrasound guided.

 

HISTORY OF PRESENT ILLNESS AND HOSPITAL COURSE:  This is a 64-year-old female 
with past medical history as outlined in the history of present illness, date 
of admission, presented to the hospital with back pain and fever and was found 
to have sepsis secondary to right lower lobe pneumonia.  Ultimately, had 
positive legionella antigen from urine confirming Legionnaires' disease, which 
was complicated by parapneumonic effusion, grew nothing out of culture, status 
post thoracentesis.  The hospital stay was notable, the patient was treated 
with antibiotics, azithromycin and ceftriaxone.  She developed new-onset atrial 
fibrillation, which she was treated with metoprolol and started on amiodarone 
as well as Lovenox, transitioned to rivaroxaban prior to discharge without 
complications.  Her hypoxic respiratory failure resolved with treatment of her 
pneumonia as well as gentle diuresis during the course of her hospital stay 
with Lasix..  She was not discharged on any standing Lasix.  Please evaluate 
volume status at followup.  She was noted to have hypokalemia while on 
diuretics. She underwent vigorous potassium repletion with moderate success. 
She required standing potassium as well as daily repletion.  For this reason, 
she was discharged on 40 twice daily and no Lasix.  Please follow up her 
potassium at the time of followup.  On the day of discharge, she was on room air
, tolerating ambulation throughout the hallway, felt back to her usual self.  
She is motivated to maintain her abstinence with tobacco.  There were no other 
complications during the course of her hospital stay.

 

FOLLOWUP:

1.  As indicated above.  Follow up potassium, adjust standing potassium as 
necessary.

2.  Evaluate volume status, add Lasix as necessary.

3.  Ensure followup with Dr. Bowers.

4.  Ensure resolution of pleural effusion.

5.  Adjust medications for atrial fibrillation as necessary; has been stable 
during the course of her hospital stay.

6.  No other specific labs or vitals.

7.  Follow up protein in the urine.  The patient did have a 24-hour collection 
after urinalysis indicated proteinuria.  Her 24-hour urine protein was 416 mg 
for 24 hours, elevated, but not meeting nephrotic range.

 

Reasons to return to the hospital including, but not limited to, recurrent or 
worsening symptoms including chest pain, shortness of breath, nausea, vomiting, 
lightheadedness, loss of consciousness, fevers, chills, worsening cough, 
bleeding from any source were discussed with the patient.  She acknowledged 
understanding.

 

TIME SPENT:  Greater than 45 minutes was spent on the discharge of this patient
, greater than half was spent face-to-face with the patient.

 

 [id number]/CPS #: [id number]

[facility] HospitalSULEMAN

## 2019-10-02 NOTE — PN
Hospitalist Progress Note


Date of Service: 10/02/19


Subjective-


   Pt has no complaints. Did say feet got more swollen. Pt insists to go home 

today. Used IS yesterday and walked around a lot.





   No overnight events





Objective-





vitals


temp-97.6


heart rate-89


resp. rate-21


02 sat-91


room air


BP-155/77





general-welll apeearing


heart-s1, s2 reg rate rhythm


-lungs-bilateral lungs inspiratory rhonchi


-abdomen soft, normal, nondistended


-extremities- Edema worse at feet at 2+ and 1+ in leg up to knee. normal above 

knee(slightly edematous). No back pain.








labs-


rbc-3.51


hgb-10.0


hct-29


abs lymphocytes-0.8


abs monocytes- 1.1


k-3.2


Cl-100


CO2-38


bun-10


creat-1.04


BUN/creat-9.6


Glucose- 164


Ca-8.5 (improved)








Assessment/Plan-





64 year old woman with history of nephrolithiasis,HTN, Diabetes,osteoporosis, 

multiple past stents and tobacco use admitted on 9/22 with back pain and found 

to have RLL pneumonia on the CT abd/pelvis and urine legionella antigen positive

, in ICU from 9/24-9/27 for hypoxic respiratory failure requiring Vapotherm,  s/

p thoracocentesis yesterday with only scant fluid obtained. Her course 

complicated with possible early shingles and anasarca due to malnutrition. 





(1)Acute hypoxemic respiratory failure, Legionella pneumonia with right side 

pleural effusion + MRSA in sputum


. Azithromycin completed. day 10 azithro(500mg in 250mls/hr @250mls/hr IV); day 

5 vancomycin 1,000 mg. 250 mls @166.667 mls/hr IV Q8hrs


required ICU stay 9/23-27 for high flow nasal cannula


thoracocentesis 9/27/2019 only scant fluid obtained; LDH/total protein are 

pending


continue abx for now, wean down O2


may be some component of pulmonary edema related to hypoalbuminemia. Lasix 

stopped due to K+ drop.


-Chest X-ray today to see status of pleural effusion and pneumonia





(2) Legionella pneumonia


Azithromycin completed (500 mg in 250 mls @250 mls/hr)


  





(3) Atrial fibrillation. This is a new diagnosis for her


TTE no valvular changes


metoprolol (75mg PO daily) +amiodarone (200mg PO daily)


on Rivaroxaban 20mg PO QPM for [id number] of 2 for now; can be switched to 

eliquis or xarelto prior to discharge


   





(4) Hypokalemia maybe related to alkalosis?  


replete aggressively today and recheck this afternoon   -- K Cl 20 meq PO daily





(5) Anasarca with marked hypoalbuminemia  


-urine last week had 3+ protein. Urine  total protein is 416 (high)


 





(6) Shingles


-new onset of right burning sensation in dermatone distribution, concerning for 

shingles although no vesicles seen now.


-Acyclovir (800mg PO 5x daily)7 days. Stopped.


-Gabapentin 100mg PO BID(for pain)





(7) COPD exacerbation


-complicated with acute COPD exacerbation with ongoing pneumonia


completed 3 days of prednisone burst   


-albuterol/ipratropium(2.5mg/.5mg) 1 neb





(8) Nicotine dependence


- on nicotine patch with plans to abstain longterm   





(9)Diabetes


- lispro sliding scale


-Glipizide 5 mg PO daily





(10) DVT prophylaxis


-  Rivaroxaban 20mg PO QPM

## 2019-10-02 NOTE — PN
Progress Note





- Progress Note


Date of Service: 10/02/19


SOAP: 


Subjective:


CC: pneumonia


HPI: 64 year old woman tobacco use and Right sided pneumonia; complicated by 

pleural effusion, had US guided thoracentesis.  Weaned off oxygen.  No cough 

and no shortness of breath with ambulation.  Appetite is good.  No fever, rash, 

or diarrhea.  Interested in quitting tobacco.








Objective:





 Vital Signs











Temp  37.2 C   10/02/19 07:15


 


Pulse  88   10/02/19 07:15


 


Resp  20   10/02/19 08:00


 


BP  157/78   10/02/19 07:15


 


Pulse Ox  92   10/02/19 07:15








 Intake & Output











 10/01/19 10/02/19 10/02/19





 18:59 06:59 18:59


 


Intake Total 1070 640 


 


Output Total 0 0 


 


Balance 1070 640 


 


Weight  150 lb 9.6 oz 


 


Intake:   


 


  IV Fluids 350  


 


    ABX - VANCOMYCIN 250  


 


    potassium 100  


 


  Oral 720 640 


 


Output:   


 


  Urine 0 0 


 


Other:   


 


  Estimated Void Medium  








Gen:awake, no distress


HEENT: no thrush


Heart:RRR no murmur


Lungs:scattered rales at bases, no decr BS


Abd:+BS NTND soft


Skin: no rash


 Laboratory Results - last 24 hr











  10/01/19 10/01/19 10/01/19





  11:26 12:12 16:15


 


WBC   


 


RBC   


 


Hgb   


 


Hct   


 


MCV   


 


MCH   


 


MCHC   


 


RDW   


 


Plt Count   


 


MPV   


 


Neut % (Auto)   


 


Lymph % (Auto)   


 


Mono % (Auto)   


 


Eos % (Auto)   


 


Baso % (Auto)   


 


Absolute Neuts (auto)   


 


Absolute Lymphs (auto)   


 


Absolute Monos (auto)   


 


Absolute Eos (auto)   


 


Absolute Basos (auto)   


 


Absolute Nucleated RBC   


 


Nucleated RBC %   


 


Sodium   


 


Potassium   


 


Chloride   


 


Carbon Dioxide   


 


Anion Gap   


 


BUN   


 


Creatinine   


 


Est GFR ( Amer)   


 


Est GFR (Non-Af Amer)   


 


BUN/Creatinine Ratio   


 


Glucose   


 


POC Glucose (mg/dL)   153 H 


 


Calcium   


 


Urine Collection Time    24


 


Urine Total Volume    5200


 


Ur Total Protein Conc    8


 


Ur Total Protein 24 Hr    416 H


 


Vancomycin Trough  28.6  














  10/01/19 10/02/19 10/02/19





  16:26 05:37 05:37


 


WBC   8.5 


 


RBC   3.51 L 


 


Hgb   10.0 L 


 


Hct   29 L 


 


MCV   83 


 


MCH   28 


 


MCHC   34 


 


RDW   14 


 


Plt Count   407 


 


MPV   8.0 


 


Neut % (Auto)   75.4 


 


Lymph % (Auto)   9.7 


 


Mono % (Auto)   13.2 


 


Eos % (Auto)   1.2 


 


Baso % (Auto)   0.5 


 


Absolute Neuts (auto)   6.4 


 


Absolute Lymphs (auto)   0.8 L 


 


Absolute Monos (auto)   1.1 H 


 


Absolute Eos (auto)   0.1 


 


Absolute Basos (auto)   0.0 


 


Absolute Nucleated RBC   0.0 


 


Nucleated RBC %   0.0 


 


Sodium    145


 


Potassium    3.2 L


 


Chloride    100 L


 


Carbon Dioxide    38 H


 


Anion Gap    7


 


BUN    10


 


Creatinine    1.04 H


 


Est GFR ( Amer)    65.0


 


Est GFR (Non-Af Amer)    53.7


 


BUN/Creatinine Ratio    9.6


 


Glucose    164 H


 


POC Glucose (mg/dL)  125 H  


 


Calcium    8.5 L


 


Urine Collection Time   


 


Urine Total Volume   


 


Ur Total Protein Conc   


 


Ur Total Protein 24 Hr   


 


Vancomycin Trough   














Assessment:


1. Community acquired pneumonia, due to Legionella, improving


2. Uncomplicated pleural effusion, parapneumonic


3. tobacco abuse in brief remission


4. T2 diabetes mellitus











Plan:


1. Recheck CXR to be sure pleural effusion not getting larger.  Assuming that 

is the case, follow up with me 1 week, recheck CXR 1 month


35 minutes floor time >50% face to face with patient and significant other in 

counseling regarding above issues